# Patient Record
Sex: FEMALE | Employment: FULL TIME | URBAN - METROPOLITAN AREA
[De-identification: names, ages, dates, MRNs, and addresses within clinical notes are randomized per-mention and may not be internally consistent; named-entity substitution may affect disease eponyms.]

---

## 2021-06-02 NOTE — PROGRESS NOTES
SUBJECTIVE:   Haley Dejesus is a 25 year old female who presents to clinic today to discuss the following problem(s).    Patient is new to clinic and due for annual exam, however she prefers to address acute issues only at this time. Will plan for a return preventive visit in the near future.    SPICE: Patient was living back in [place], [place] with her boyfriend up until about two months ago when she went through a very bad break up. Over the past two months she has left her job, her car has broken down and now she is living in her mother's basement. It has been an extremely stressful time during which she has experienced the concerning symptoms listed below.    Sore throat with swelling  - three separate times over the past few months she will develop severe sore throat with associated fever, chills, body aches, difficulty swallowing  - she shows me a picture of her throat during one of these episodes and her tonsils do appear significantly enlarged and enflamed  - the episodes last for about a week and then resolve on their own  - the last episode was about three weeks ago now  - she worries that the episodes appear to be getting worse with each successive episode  - denies any significant lymphadenopathy  - no associated rash     Abnormal weight loss/fatigue  - believes she has lost about 20 pounds over the past 6 months without trying  - associated fatigue, always tired   - hard to fall asleep, hard to stay asleep with current anxieties  - she does acknowledge when she is stressed she loses her appetite  - she also acknowledges she hasn't been sleeping well because of stress  - she also acknowledges she was going to the gym almost daily and sometimes twice daily before her recent breakup. She still remains physically active by nowhere near what she was before then  - she acknowledges her menstrual cycle can be very irregular when she is stressed    ADHD  - previously diagnosed when she was about 20  - has been  "taking adderall since that time  - does not take it every day, tries to use it on a PRN basis  - agrees to complete an ARI today to have previous medical records faxed over        ROS: 10 point ROS neg other than the symptoms noted above in the HPI.    History reviewed. No pertinent past medical history.  History reviewed. No pertinent surgical history.  Family History   Problem Relation Age of Onset     Hypothyroidism Mother      Cystic Fibrosis Mother         mild form      Hypothyroidism Maternal Aunt      Social History     Tobacco Use     Smoking status: Never Smoker     Smokeless tobacco: Never Used   Substance Use Topics     Alcohol use: Yes     Frequency: 2-3 times a week     Drinks per session: 3 or 4     Drug use: Never     Social History     Social History Narrative     Not on file       Current Outpatient Medications   Medication     albuterol (PROAIR HFA/PROVENTIL HFA/VENTOLIN HFA) 108 (90 Base) MCG/ACT inhaler     amphetamine-dextroamphetamine (ADDERALL) 10 MG tablet     FLUoxetine 20 MG tablet     fluticasone-salmeterol (ADVAIR) 100-50 MCG/DOSE inhaler     levonorgestrel-ethinyl estradiol (AVIANE) 0.1-20 MG-MCG tablet     No current facility-administered medications for this visit.      I have reviewed the patient's past medical, surgical, family, and social history.     OBJECTIVE:   /86 (BP Location: Right arm, Patient Position: Sitting, Cuff Size: Adult Regular)   Pulse 88   Temp 97.4  F (36.3  C) (Skin)   Resp 15   Ht 1.689 m (5' 6.5\")   Wt 56.5 kg (124 lb 8 oz)   LMP 05/18/2021 (Approximate)   SpO2 95%   BMI 19.79 kg/m      Constitutional: well-appearing, appears stated age  Eyes: conjunctivae without erythema, sclera anicteric.   Cardiac: regular rate and rhythm, normal S1/S2, no murmur/rubs/gallops  Respiratory: lungs clear to auscultation bilaterally, normal work of breathing, no wheezes/crackles  Skin: no rashes, lesions, or wounds  Psych: affect is full and appropriate, speech " is fluent and non-pressured    ASSESSMENT AND PLAN:     Haley was seen today for establish care, recheck medication, throat problem and weight loss.    Diagnoses and all orders for this visit:    Screening for lipid disorders  -     Lipid Panel ([facility])    Fatigue, unspecified type  -     Cancel: CBC with Diff Plt ([facility])  -     Comprehensive Metabolic Panel ([place])  -     TSH with free T4 reflex  -     Vitamin D Deficiency  -     CBC with platelets differential    Weight loss  -     Cancel: CBC with Diff Plt ([facility])  -     Comprehensive Metabolic Panel ([place])  -     TSH with free T4 reflex  -     Vitamin D Deficiency    Attention deficit hyperactivity disorder (ADHD), unspecified ADHD type    Throat swelling    Will check labs for possible physiologic etiology for ongoing weight loss and/or fatigue. Suspicion for significant contribution from current life stressors.     Unclear what is causing what appears to be cyclic sore throat. Patient reports she is fully immunized against COVID. Doesn't sound like mono. If symptoms return, encouraged patient to come in for possible throat swab and exam, could consider referral to ENT depending on exam.     I did offer resources of [facility] as well as dietician given concerns for ongoing anxiety as well as weight loss I suspect is due to anxiety, but patient declines both of these today.     I will await records from previous provider as address ongoing ADHD accordingly.       Issa Mendoza MD  [facility]  06/08/2021, 4:12 PM

## 2021-06-08 ENCOUNTER — OFFICE VISIT (OUTPATIENT)
Dept: FAMILY MEDICINE | Facility: CLINIC | Age: 26
End: 2021-06-08
Payer: COMMERCIAL

## 2021-06-08 VITALS
SYSTOLIC BLOOD PRESSURE: 124 MMHG | WEIGHT: 124.5 LBS | RESPIRATION RATE: 15 BRPM | TEMPERATURE: 97.4 F | HEIGHT: 67 IN | BODY MASS INDEX: 19.54 KG/M2 | DIASTOLIC BLOOD PRESSURE: 86 MMHG | OXYGEN SATURATION: 95 % | HEART RATE: 88 BPM

## 2021-06-08 DIAGNOSIS — R22.1 THROAT SWELLING: ICD-10-CM

## 2021-06-08 DIAGNOSIS — R53.83 FATIGUE, UNSPECIFIED TYPE: ICD-10-CM

## 2021-06-08 DIAGNOSIS — R63.4 WEIGHT LOSS: ICD-10-CM

## 2021-06-08 DIAGNOSIS — Z13.220 SCREENING FOR LIPID DISORDERS: Primary | ICD-10-CM

## 2021-06-08 DIAGNOSIS — F90.9 ATTENTION DEFICIT HYPERACTIVITY DISORDER (ADHD), UNSPECIFIED ADHD TYPE: ICD-10-CM

## 2021-06-08 LAB
ALBUMIN SERPL-MCNC: 4 G/DL (ref 3.3–4.6)
ALP SERPL-CCNC: 61 U/L (ref 40–150)
ALT SERPL-CCNC: 24 U/L (ref 0–50)
AST SERPL-CCNC: 24 U/L (ref 0–45)
BASOPHILS # BLD AUTO: 0.1 10E9/L (ref 0–0.2)
BASOPHILS NFR BLD AUTO: 1.2 %
BILIRUB SERPL-MCNC: 1.5 MG/DL (ref 0.2–1.3)
BUN SERPL-MCNC: 11 MG/DL (ref 5–24)
CALCIUM SERPL-MCNC: 9.8 MG/DL (ref 8.5–10.4)
CHLORIDE SERPLBLD-SCNC: 105 MMOL/L (ref 94–109)
CHOLEST SERPL-MCNC: 146 MG/DL (ref 0–200)
CHOLEST/HDLC SERPL: 2.4 {RATIO} (ref 0–5)
CO2 SERPL-SCNC: 29 MMOL/L (ref 20–32)
CREAT SERPL-MCNC: 1.1 MG/DL (ref 0.6–1.3)
DIFFERENTIAL METHOD BLD: NORMAL
EGFR CALCULATED (NON BLACK REFERENCE): 64.9
EOSINOPHIL # BLD AUTO: 0.2 10E9/L (ref 0–0.7)
EOSINOPHIL NFR BLD AUTO: 2.8 %
ERYTHROCYTE [DISTWIDTH] IN BLOOD BY AUTOMATED COUNT: 13.2 % (ref 10–15)
FASTING SPECIMEN: NO
GLUCOSE SERPL-MCNC: 91 MG/DL (ref 60–99)
HCT VFR BLD AUTO: 44.5 % (ref 35–47)
HDLC SERPL-MCNC: 60 MG/DL
HGB BLD-MCNC: 14.9 G/DL (ref 11.7–15.7)
IMM GRANULOCYTES # BLD: 0 10E9/L (ref 0–0.4)
IMM GRANULOCYTES NFR BLD: 0.4 %
LDLC SERPL CALC-MCNC: 72 MG/DL (ref 0–129)
LYMPHOCYTES # BLD AUTO: 3.2 10E9/L (ref 0.8–5.3)
LYMPHOCYTES NFR BLD AUTO: 43.6 %
MCH RBC QN AUTO: 30.3 PG (ref 26.5–33)
MCHC RBC AUTO-ENTMCNC: 33.5 G/DL (ref 31.5–36.5)
MCV RBC AUTO: 91 FL (ref 78–100)
MONOCYTES # BLD AUTO: 0.6 10E9/L (ref 0–1.3)
MONOCYTES NFR BLD AUTO: 7.5 %
NEUTROPHILS # BLD AUTO: 3.3 10E9/L (ref 1.6–8.3)
NEUTROPHILS NFR BLD AUTO: 44.5 %
NRBC # BLD AUTO: 0 10*3/UL
NRBC BLD AUTO-RTO: 0 /100
PLATELET # BLD AUTO: 263 10E9/L (ref 150–450)
POTASSIUM SERPL-SCNC: 4.2 MMOL/L (ref 3.4–5.3)
PROT SERPL-MCNC: 7 G/DL (ref 6.8–8.8)
RBC # BLD AUTO: 4.91 10E12/L (ref 3.8–5.2)
SODIUM SERPL-SCNC: 139 MMOL/L (ref 137.3–146.3)
TRIGL SERPL-MCNC: 70 MG/DL (ref 0–150)
TSH SERPL DL<=0.005 MIU/L-ACNC: 1.69 MU/L (ref 0.4–4)
VLDL-CHOLESTEROL: 14 (ref 7–32)
WBC # BLD AUTO: 7.4 10E9/L (ref 4–11)

## 2021-06-08 RX ORDER — LEVONORGESTREL/ETHIN.ESTRADIOL 0.1-0.02MG
1 TABLET ORAL DAILY
COMMUNITY
End: 2021-06-18

## 2021-06-08 RX ORDER — DEXTROAMPHETAMINE SACCHARATE, AMPHETAMINE ASPARTATE, DEXTROAMPHETAMINE SULFATE AND AMPHETAMINE SULFATE 2.5; 2.5; 2.5; 2.5 MG/1; MG/1; MG/1; MG/1
10 TABLET ORAL 2 TIMES DAILY
COMMUNITY
End: 2021-06-18

## 2021-06-08 RX ORDER — FLUOXETINE 20 MG/1
20 TABLET, FILM COATED ORAL DAILY
COMMUNITY
End: 2021-06-18

## 2021-06-08 RX ORDER — ALBUTEROL SULFATE 90 UG/1
2 AEROSOL, METERED RESPIRATORY (INHALATION) EVERY 6 HOURS
COMMUNITY

## 2021-06-08 SDOH — HEALTH STABILITY: MENTAL HEALTH: HOW OFTEN DO YOU HAVE 6 OR MORE DRINKS ON ONE OCCASION?: NOT ASKED

## 2021-06-08 SDOH — HEALTH STABILITY: MENTAL HEALTH: HOW MANY STANDARD DRINKS CONTAINING ALCOHOL DO YOU HAVE ON A TYPICAL DAY?: 3 OR 4

## 2021-06-08 SDOH — HEALTH STABILITY: MENTAL HEALTH: HOW OFTEN DO YOU HAVE A DRINK CONTAINING ALCOHOL?: 2-3 TIMES A WEEK

## 2021-06-08 ASSESSMENT — MIFFLIN-ST. JEOR: SCORE: 1339.42

## 2021-06-09 LAB — DEPRECATED CALCIDIOL+CALCIFEROL SERPL-MC: 34 UG/L (ref 20–75)

## 2021-06-09 ASSESSMENT — ANXIETY QUESTIONNAIRES
3. WORRYING TOO MUCH ABOUT DIFFERENT THINGS: MORE THAN HALF THE DAYS
5. BEING SO RESTLESS THAT IT IS HARD TO SIT STILL: MORE THAN HALF THE DAYS
6. BECOMING EASILY ANNOYED OR IRRITABLE: NOT AT ALL
1. FEELING NERVOUS, ANXIOUS, OR ON EDGE: NEARLY EVERY DAY
7. FEELING AFRAID AS IF SOMETHING AWFUL MIGHT HAPPEN: NOT AT ALL
GAD7 TOTAL SCORE: 10
IF YOU CHECKED OFF ANY PROBLEMS ON THIS QUESTIONNAIRE, HOW DIFFICULT HAVE THESE PROBLEMS MADE IT FOR YOU TO DO YOUR WORK, TAKE CARE OF THINGS AT HOME, OR GET ALONG WITH OTHER PEOPLE: VERY DIFFICULT
2. NOT BEING ABLE TO STOP OR CONTROL WORRYING: MORE THAN HALF THE DAYS

## 2021-06-09 ASSESSMENT — PATIENT HEALTH QUESTIONNAIRE - PHQ9
5. POOR APPETITE OR OVEREATING: SEVERAL DAYS
SUM OF ALL RESPONSES TO PHQ QUESTIONS 1-9: 13

## 2021-06-10 ASSESSMENT — ANXIETY QUESTIONNAIRES: GAD7 TOTAL SCORE: 10

## 2021-06-17 ENCOUNTER — TELEPHONE (OUTPATIENT)
Dept: FAMILY MEDICINE | Facility: CLINIC | Age: 26
End: 2021-06-17

## 2021-06-17 DIAGNOSIS — F41.9 ANXIETY AND DEPRESSION: ICD-10-CM

## 2021-06-17 DIAGNOSIS — F90.9 ATTENTION DEFICIT HYPERACTIVITY DISORDER (ADHD), UNSPECIFIED ADHD TYPE: ICD-10-CM

## 2021-06-17 DIAGNOSIS — F32.A ANXIETY AND DEPRESSION: ICD-10-CM

## 2021-06-17 DIAGNOSIS — Z30.9 ENCOUNTER FOR CONTRACEPTIVE MANAGEMENT, UNSPECIFIED TYPE: Primary | ICD-10-CM

## 2021-06-17 NOTE — TELEPHONE ENCOUNTER
Who is calling? Patient  Medication name: Adderall, Fluoxetine, levonorgestrel  Is this a refill request? Yes  Have they contacted the pharmacy?  No  Pharmacy:   [id number] - [facility], [place] - [address]  [address] [address]  Phone: [phone] Fax: [phone]    Question/Concern: Per SHIRA Mendoza waiting on records from [facility]. Records were pulled in from [facility]. Patient is in need of refills for these 3 meds  Would patient like a call back? Debo Alvarez

## 2021-06-17 NOTE — TELEPHONE ENCOUNTER
Adderall -  review shows last time patient prescribed Adderall 10 mg was 11/15/20 #60 (30 days) by a provider in [place]  Oral contraceptive - HISTORICAL  Advair - HISTORICAL  Fluoxetine - HISTORICAL    According to Care Everywhere, last visit with previous provider in [place] was 11/11/20 and med list:      Fluoxetine 20 mg daily    Albuterol HFA 1-2 puffs every 4 hours PRN    Fluticasone propionate-salmeterol 100-50 1 puff BID    Levonorgesterel-ethinyl estradiol 0.1-20 daily    Gabapentin 300 mg TID PRN for panic    Dextroamphetamine-amphetamine 10 mg BID PRN    Dr. Mendoza, only Adderall was addressed at last visit, not sure if UDS done and controlled contract completed for Adderall. Do you want an appointment for Medication Refill?     Shirley Garza RN  06/18/21  12:11 PM

## 2021-06-18 ENCOUNTER — TELEPHONE (OUTPATIENT)
Dept: FAMILY MEDICINE | Facility: CLINIC | Age: 26
End: 2021-06-18

## 2021-06-18 RX ORDER — LEVONORGESTREL/ETHIN.ESTRADIOL 0.1-0.02MG
1 TABLET ORAL DAILY
Qty: 90 TABLET | Refills: 3 | Status: SHIPPED | OUTPATIENT
Start: 2021-06-18 | End: 2022-06-08

## 2021-06-18 RX ORDER — FLUOXETINE 20 MG/1
20 TABLET, FILM COATED ORAL DAILY
Qty: 90 TABLET | Refills: 3 | Status: SHIPPED | OUTPATIENT
Start: 2021-06-18 | End: 2022-06-08

## 2021-06-18 RX ORDER — DEXTROAMPHETAMINE SACCHARATE, AMPHETAMINE ASPARTATE, DEXTROAMPHETAMINE SULFATE AND AMPHETAMINE SULFATE 2.5; 2.5; 2.5; 2.5 MG/1; MG/1; MG/1; MG/1
10 TABLET ORAL 2 TIMES DAILY
Qty: 60 TABLET | Refills: 0 | Status: SHIPPED | OUTPATIENT
Start: 2021-06-18 | End: 2021-09-28

## 2021-06-18 NOTE — TELEPHONE ENCOUNTER
Prior Authorization Retail Medication Request    Medication/Dose: amphetamine-dextroamphetamine (ADDERALL) 10 MG tablet  ICD code (if different than what is on RX):    Previously Tried and Failed:    Rationale:      Insurance Name:    Insurance ID:        Pharmacy Information (if different than what is on RX)  Name:    Phone:      Shirley Garza RN  06/18/21  2:45 PM

## 2021-06-18 NOTE — TELEPHONE ENCOUNTER
After chart review, I agree to refill medications as noted below. Plan on monthly monitoring of ADHD medications with random Utox for now.     Haley was seen today for medication request.    Diagnoses and all orders for this visit:    Encounter for contraceptive management, unspecified type  -     levonorgestrel-ethinyl estradiol (AVIANE) 0.1-20 MG-MCG tablet; Take 1 tablet by mouth daily    Anxiety and depression  -     FLUoxetine 20 MG tablet; Take 1 tablet (20 mg) by mouth daily    Attention deficit hyperactivity disorder (ADHD), unspecified ADHD type  -     amphetamine-dextroamphetamine (ADDERALL) 10 MG tablet; Take 1 tablet (10 mg) by mouth 2 times daily      Issa Mendoza MD  12:58 PM, June 18, 2021

## 2021-06-21 NOTE — TELEPHONE ENCOUNTER
Central Prior Authorization Team   [phone]    PA Initiation    Medication: amphetamine-dextroamphetamine (ADDERALL) 10 MG tablet  Insurance Company: [employer] ([facility]) - Phone [phone] Fax [phone]  Pharmacy [facility]illing the Rx[id number] - [facility], [place] - [address]  Filling Pharmacy Phone: [phone]  Filling Pharmacy Fax: [phone]  Start Date: 6/21/2021

## 2021-06-22 ENCOUNTER — OFFICE VISIT (OUTPATIENT)
Dept: FAMILY MEDICINE | Facility: CLINIC | Age: 26
End: 2021-06-22
Payer: COMMERCIAL

## 2021-06-22 VITALS
BODY MASS INDEX: 20.25 KG/M2 | RESPIRATION RATE: 13 BRPM | TEMPERATURE: 97.1 F | HEIGHT: 66 IN | OXYGEN SATURATION: 97 % | HEART RATE: 92 BPM | WEIGHT: 126 LBS | DIASTOLIC BLOOD PRESSURE: 85 MMHG | SYSTOLIC BLOOD PRESSURE: 122 MMHG

## 2021-06-22 DIAGNOSIS — J35.1 ENLARGED TONSILS: ICD-10-CM

## 2021-06-22 DIAGNOSIS — N32.9 BLADDER PROBLEM: Primary | ICD-10-CM

## 2021-06-22 DIAGNOSIS — R11.0 NAUSEA: ICD-10-CM

## 2021-06-22 DIAGNOSIS — F90.9 ATTENTION DEFICIT HYPERACTIVITY DISORDER (ADHD), UNSPECIFIED ADHD TYPE: ICD-10-CM

## 2021-06-22 PROBLEM — F43.9 STRESS: Status: ACTIVE | Noted: 2020-11-11

## 2021-06-22 PROBLEM — F41.9 ANXIETY: Status: ACTIVE | Noted: 2020-11-11

## 2021-06-22 PROBLEM — J45.909 ASTHMA: Status: ACTIVE | Noted: 2020-11-11

## 2021-06-22 PROBLEM — R03.0 ELEVATED BP WITHOUT DIAGNOSIS OF HYPERTENSION: Status: ACTIVE | Noted: 2020-11-11

## 2021-06-22 LAB
BILIRUBIN UR: NEGATIVE MG/DL
BLOOD UR: NEGATIVE MG/DL
GLUCOSE URINE: NEGATIVE
KETONES UR QL: NEGATIVE MG/DL
LEUKOCYTE ESTERASE UR: ABNORMAL
NITRITE UR QL STRIP: NEGATIVE MG/DL
PH UR STRIP: 7.5 [PH] (ref 4.5–8)
PROTEIN UR: NEGATIVE MG/DL
SP GR UR STRIP: 1.01 (ref 1–1.03)
UROBILINOGEN UR STRIP-ACNC: ABNORMAL E.U./DL

## 2021-06-22 RX ORDER — GABAPENTIN 300 MG/1
300 CAPSULE ORAL
COMMUNITY
Start: 2020-11-11 | End: 2022-01-03

## 2021-06-22 ASSESSMENT — MIFFLIN-ST. JEOR: SCORE: 1346.15

## 2021-06-22 NOTE — PROGRESS NOTES
SUBJECTIVE:   Haley Dejesus is a 25 year old female who presents to clinic today to discuss the following problem(s).    (N32.9) Bladder problem  (primary encounter diagnosis)  - for about a week  - burning, clouding, itching  - thinks maybe it's a little bit better more recently  - has had UTIs in the past, the worst ended up sending her to the hospital for IV antibiotics  - today, denies fever, chills, body aches, nausea, no flank pain  - denies fishy odor although does report her urine is quite strong smelling  - no identified vaginal discharge although she does report she just finished her period    Persistent throat concerns  - as previously discussed  - no more acute pain episodes, but she does wonder at the tonsils being persistently enlarged  - could strep throat cause something like this?  - also notes associated sinus congestion and drainage with intermittent headaches  - denies difficulty swallowing or breathing     Nausea  - chronic  - intermittent  - has taken zofran occasionally for this in the past, has run out of this medication  - acknowledges significant life stressors currently  - does report she generally eats a very healthy diet  - drinks lots of coffee  - doesn't really exercise more than once a week  - frequently feels thirsty, thinks she could drink more water    ADHD  - currently out of her medication, was told by the pharmacy the med required a prior authorization and she hasn't heard since then     ROS: 10 point ROS neg other than the symptoms noted above in the HPI.      History reviewed. No pertinent past medical history.  History reviewed. No pertinent surgical history.  Family History   Problem Relation Age of Onset     Hypothyroidism Mother      Cystic Fibrosis Mother         mild form      Hypothyroidism Maternal Aunt      Social History     Tobacco Use     Smoking status: Never Smoker     Smokeless tobacco: Never Used   Substance Use Topics     Alcohol use: Yes     Frequency:  "2-3 times a week     Drinks per session: 3 or 4     Drug use: Never     Social History     Social History Narrative     Not on file       Current Outpatient Medications   Medication     albuterol (PROAIR HFA/PROVENTIL HFA/VENTOLIN HFA) 108 (90 Base) MCG/ACT inhaler     amphetamine-dextroamphetamine (ADDERALL) 10 MG tablet     FLUoxetine 20 MG tablet     fluticasone-salmeterol (ADVAIR) 100-50 MCG/DOSE inhaler     levonorgestrel-ethinyl estradiol (AVIANE) 0.1-20 MG-MCG tablet     No current facility-administered medications for this visit.      I have reviewed the patient's past medical, surgical, family, and social history.     OBJECTIVE:   /85   Pulse 92   Temp 97.1  F (36.2  C)   Resp 13   Ht 1.689 m (5' 6.5\")   Wt 57.2 kg (126 lb)   LMP 06/15/2021 (Approximate)   SpO2 97%   BMI 20.03 kg/m      Constitutional: well-appearing, appears stated age  Eyes: conjunctivae without erythema, sclera anicteric.   Cardiac: regular rate and rhythm, normal S1/S2, no murmur/rubs/gallops  Respiratory: lungs clear to auscultation bilaterally, normal work of breathing, no wheezes/crackles  Skin: no rashes, lesions, or wounds  Psych: affect is full and appropriate, speech is fluent and non-pressured    Recent Results (from the past 24 hour(s))   Urinalysis ([place])    Collection Time: 06/22/21  2:02 PM   Result Value Ref Range    Leukocyte Esterase UR Trace (A) Negative    Nitrite Urine Negative Negative mg/dL    Protein UR Negative Negative mg/dL    Glucose Urine Negative Negative    Ketones Urine Negative Negative mg/dL    Urobilinogen mg/dL 0.2 E.U./dL 0.2 E.U./dL E.U./dL    Bilirubin UR Negative Negative mg/dL    Blood UR Negative Negative mg/dL    pH Urine 7.5 4.5 - 8.0    Specific Gravity Urine 1.015 1.005 - 1.030     U/A largely unremarkable.     ASSESSMENT AND PLAN:     Haley was seen today for urinary problem, mouth problem and refill request.    Diagnoses and all orders for this visit:    Bladder problem  - "     Urinalysis ([place])    Enlarged tonsils    Nausea    Attention deficit hyperactivity disorder (ADHD), unspecified ADHD type    U/A largely unremarkable. Given her history, I suggested a 2 day course of pyridium but if symptoms fail to improve would consider starting empiric therapy despite U/A results. Low suspicion for BV or yeast infection but cannot completely rule these out.     Tonsils appear mildly enlarged today. Patient also reports some sinus drainage and headaches which could indicate ongoing sinus issues with could be contributing to persistent symptoms with occasional flares of acute pain. Advised patient start daily flonase therapy. Will also refer to ENT at this point in case flonase does not appear to improve symptoms before Haley can be seen in clinic.     Not entirely sure what is causing the chronic nausea. I did recommend increased fluids, daily activity, decreased coffee. I suspect ongoing life stressors are part of the issue as well. Will refill zofran today and continue to monitor.     Discussed prior auth process for ADHD medications. Encouraged Haley to contact me if she has not heard resolution of this from her pharmacy within the next few days.       Issa Mendoza MD  [facility]  06/22/2021, 2:01 PM

## 2021-06-22 NOTE — NURSING NOTE
"25 year old  Chief Complaint   Patient presents with     Urinary Problem     UTI Sx started about a week ago. Some abdominal discomfort and burning during while using the bathroom     Mouth Problem     swollen tonsils since this past Feb. also had some flare up's with sweats and chills     Refill Request     zofran refill       Blood pressure 122/85, pulse 92, temperature 97.1  F (36.2  C), resp. rate 13, height 1.689 m (5' 6.5\"), weight 57.2 kg (126 lb), last menstrual period 06/15/2021, SpO2 97 %. Body mass index is 20.03 kg/m .  There is no problem list on file for this patient.      Wt Readings from Last 2 Encounters:   06/22/21 57.2 kg (126 lb)   06/08/21 56.5 kg (124 lb 8 oz)     BP Readings from Last 3 Encounters:   06/22/21 122/85   06/08/21 124/86         Current Outpatient Medications   Medication     albuterol (PROAIR HFA/PROVENTIL HFA/VENTOLIN HFA) 108 (90 Base) MCG/ACT inhaler     amphetamine-dextroamphetamine (ADDERALL) 10 MG tablet     FLUoxetine 20 MG tablet     fluticasone-salmeterol (ADVAIR) 100-50 MCG/DOSE inhaler     levonorgestrel-ethinyl estradiol (AVIANE) 0.1-20 MG-MCG tablet     No current facility-administered medications for this visit.        Social History     Tobacco Use     Smoking status: Never Smoker     Smokeless tobacco: Never Used   Substance Use Topics     Alcohol use: Yes     Frequency: 2-3 times a week     Drinks per session: 3 or 4     Drug use: Never       Health Maintenance Due   Topic Date Due     PREVENTIVE CARE VISIT  Never done     CHLAMYDIA SCREENING  Never done     ADVANCE CARE PLANNING  Never done     DEPRESSION ACTION PLAN  Never done     DTAP/TDAP/TD IMMUNIZATION (2 - Td) 09/22/2008     HIV SCREENING  Never done     HEPATITIS C SCREENING  Never done     PAP  Never done       No results found for: PAP      June 22, 2021 1:55 PM  "

## 2021-06-23 ASSESSMENT — ASTHMA QUESTIONNAIRES: ACT_TOTALSCORE: 22

## 2021-06-23 NOTE — TELEPHONE ENCOUNTER
Prior Authorization Approval    Authorization Effective Date: 6/21/2021  Authorization Expiration Date: 6/21/2022  Medication: amphetamine-dextroamphetamine (ADDERALL) 10 MG tablet-PA APPROVED   Approved Dose/Quantity:   Reference #:     Insurance Company: [employer]qui ([facility]) - Phone [phone] Fax [phone]  Expected CoPay:       CoPay Card Available:      Foundation Assistance Needed:    Which Pharmacy is filling the prescription (Not needed for infusion/clinic administered): [id number], [place] - [address]  Pharmacy Notified: Yes- **Instructed pharmacy to notify patient when script is ready to /ship.**   Patient Notified: Yes

## 2021-06-26 ENCOUNTER — HEALTH MAINTENANCE LETTER (OUTPATIENT)
Age: 26
End: 2021-06-26

## 2021-07-12 NOTE — TELEPHONE ENCOUNTER
FUTURE VISIT INFORMATION      FUTURE VISIT INFORMATION:    Date: 8/3/21    Time: 3 PM    Location: [facility]  REFERRAL INFORMATION:    Referring provider:  Dr. Issa Mendoza    Referring providers clinic:  [facility]    Reason for visit/diagnosis: Enlarged Tonsils    RECORDS REQUESTED FROM:       Clinic name Comments Records Status Imaging Status   [facility] 6/22/21, 6/8/21 - [facility] OV with Dr. Mendoza Epic

## 2021-08-03 ENCOUNTER — PRE VISIT (OUTPATIENT)
Dept: OTOLARYNGOLOGY | Facility: CLINIC | Age: 26
End: 2021-08-03

## 2021-09-28 ENCOUNTER — MYC REFILL (OUTPATIENT)
Dept: FAMILY MEDICINE | Facility: CLINIC | Age: 26
End: 2021-09-28

## 2021-09-28 DIAGNOSIS — F90.9 ATTENTION DEFICIT HYPERACTIVITY DISORDER (ADHD), UNSPECIFIED ADHD TYPE: ICD-10-CM

## 2021-09-28 RX ORDER — DEXTROAMPHETAMINE SACCHARATE, AMPHETAMINE ASPARTATE, DEXTROAMPHETAMINE SULFATE AND AMPHETAMINE SULFATE 2.5; 2.5; 2.5; 2.5 MG/1; MG/1; MG/1; MG/1
10 TABLET ORAL 2 TIMES DAILY
Qty: 60 TABLET | Refills: 0 | Status: SHIPPED | OUTPATIENT
Start: 2021-09-28 | End: 2022-01-02

## 2021-09-28 NOTE — TELEPHONE ENCOUNTER
reviewed. No concerns. Med refilled as requested.    Haley was seen today for refill request.    Diagnoses and all orders for this visit:    Attention deficit hyperactivity disorder (ADHD), unspecified ADHD type  -     amphetamine-dextroamphetamine (ADDERALL) 10 MG tablet; Take 1 tablet (10 mg) by mouth 2 times daily      Issa Mendoza MD  2:50 PM, September 28, 2021

## 2021-10-16 ENCOUNTER — HEALTH MAINTENANCE LETTER (OUTPATIENT)
Age: 26
End: 2021-10-16

## 2021-12-30 NOTE — TELEPHONE ENCOUNTER
FUTURE VISIT INFORMATION      FUTURE VISIT INFORMATION:    Date: 3/7/2022    Time: 8:30AM    Location: [facility]  REFERRAL INFORMATION:    Referring provider:  Issa Mendoza MD    Referring providers clinic:  [facility]     Reason for visit/diagnosis  Enlarged tonsils, recs in epic, ref by Issa Mendoza MD in [facility], appt made by pt    RECORDS REQUESTED FROM:       Clinic name Comments Records Status Imaging Status   [facility] ER  6/22/2021 note and referral from Issa Mendoza MD Epic

## 2022-01-02 ENCOUNTER — MYC REFILL (OUTPATIENT)
Dept: FAMILY MEDICINE | Facility: CLINIC | Age: 27
End: 2022-01-02
Payer: COMMERCIAL

## 2022-01-02 DIAGNOSIS — F90.9 ATTENTION DEFICIT HYPERACTIVITY DISORDER (ADHD), UNSPECIFIED ADHD TYPE: ICD-10-CM

## 2022-01-03 RX ORDER — DEXTROAMPHETAMINE SACCHARATE, AMPHETAMINE ASPARTATE, DEXTROAMPHETAMINE SULFATE AND AMPHETAMINE SULFATE 2.5; 2.5; 2.5; 2.5 MG/1; MG/1; MG/1; MG/1
10 TABLET ORAL 2 TIMES DAILY
Qty: 60 TABLET | Refills: 0 | Status: SHIPPED | OUTPATIENT
Start: 2022-01-03 | End: 2022-04-18

## 2022-01-03 NOTE — TELEPHONE ENCOUNTER
Amphetamine-dextroamphetamine (Adderall) 10 mg    Last Office Visit: 6/22/21  Future [facility] Appointments: None  Medication last refilled: 9/28/21 #60 with 0 refill(s)     verified - last fill date: 10/17/21 #60    Yudith Mullins RN, BSN

## 2022-01-03 NOTE — TELEPHONE ENCOUNTER
reviewed, no concerns. Med refilled as requested.     Haley was seen today for refill request.    Diagnoses and all orders for this visit:    Attention deficit hyperactivity disorder (ADHD), unspecified ADHD type  -     amphetamine-dextroamphetamine (ADDERALL) 10 MG tablet; Take 1 tablet (10 mg) by mouth 2 times daily      Issa Mendoza MD  12:27 PM, January 3, 2022

## 2022-01-26 DIAGNOSIS — F90.9 ATTENTION DEFICIT HYPERACTIVITY DISORDER (ADHD), UNSPECIFIED ADHD TYPE: ICD-10-CM

## 2022-01-26 RX ORDER — DEXTROAMPHETAMINE SACCHARATE, AMPHETAMINE ASPARTATE, DEXTROAMPHETAMINE SULFATE AND AMPHETAMINE SULFATE 2.5; 2.5; 2.5; 2.5 MG/1; MG/1; MG/1; MG/1
10 TABLET ORAL 2 TIMES DAILY
Qty: 60 TABLET | Refills: 0 | Status: CANCELLED | OUTPATIENT
Start: 2022-01-26

## 2022-01-26 NOTE — TELEPHONE ENCOUNTER
Who is calling? Patient  Medication name: amphetamine-dextroamphetamine (ADDERALL) 10 MG tablet  Is this a refill request? Yes  Have they contacted the pharmacy?  Yes  Pharmacy:   [facility] ([facility])  [address], [place]  [phone]  Fax  [phone]  Question/Concern: Refill request   Would patient like a call back? No

## 2022-01-26 NOTE — TELEPHONE ENCOUNTER
[facility] [address] and they have the Adderall prescription on file that was written 1/3/22.  Haley just needs to contact them and request they fill it.  Voicemail left for Haley relaying the information above and to call us if she has any further questions.  Yudith Mullins RN, BSN  [facility]  01/26/22  4:01 PM

## 2022-03-07 ENCOUNTER — PRE VISIT (OUTPATIENT)
Dept: OTOLARYNGOLOGY | Facility: CLINIC | Age: 27
End: 2022-03-07

## 2022-04-15 DIAGNOSIS — F90.9 ATTENTION DEFICIT HYPERACTIVITY DISORDER (ADHD), UNSPECIFIED ADHD TYPE: ICD-10-CM

## 2022-04-15 NOTE — TELEPHONE ENCOUNTER
Who is calling? Patient  Medication name: amphetamine-dextroamphetamine (ADDERALL) 10 MG tablet  Is this a refill request? Yes  Have they contacted the pharmacy?  Yes  [id number] [place], [place] - [address]  [address] [place] [address]  Phone: [phone] Fax: [phone]    Question/Concern: Out of the medication  Would patient like a call back? No

## 2022-04-15 NOTE — TELEPHONE ENCOUNTER
Medication requested: amphetamine-dextroamphetamine (ADDERALL) 10 MG tablet  Last office visit: 6/22/21  [facility] appointments: none  Medication last refilled: 1/3/22; 60 + 0 refills, last sold on 1/29/22 verified per   Last qualifying labs: N/A    Routing refill request to provider for review/approval because:  Drug not on the [facility] refill protocol     Guido BUI, RN  04/15/22 2:55 PM

## 2022-04-18 DIAGNOSIS — F90.9 ATTENTION DEFICIT HYPERACTIVITY DISORDER (ADHD), UNSPECIFIED ADHD TYPE: ICD-10-CM

## 2022-04-18 RX ORDER — DEXTROAMPHETAMINE SACCHARATE, AMPHETAMINE ASPARTATE, DEXTROAMPHETAMINE SULFATE AND AMPHETAMINE SULFATE 2.5; 2.5; 2.5; 2.5 MG/1; MG/1; MG/1; MG/1
10 TABLET ORAL 2 TIMES DAILY
Qty: 60 TABLET | Refills: 0 | Status: CANCELLED | OUTPATIENT
Start: 2022-04-18

## 2022-04-18 RX ORDER — DEXTROAMPHETAMINE SACCHARATE, AMPHETAMINE ASPARTATE, DEXTROAMPHETAMINE SULFATE AND AMPHETAMINE SULFATE 2.5; 2.5; 2.5; 2.5 MG/1; MG/1; MG/1; MG/1
10 TABLET ORAL 2 TIMES DAILY
Qty: 60 TABLET | Refills: 0 | Status: SHIPPED | OUTPATIENT
Start: 2022-04-18 | End: 2022-06-13

## 2022-04-18 NOTE — PROGRESS NOTES
reviewed, no issues. Med refilled as requested.     Diagnoses and all orders for this visit:    Attention deficit hyperactivity disorder (ADHD), unspecified ADHD type  -     amphetamine-dextroamphetamine (ADDERALL) 10 MG tablet; Take 1 tablet (10 mg) by mouth 2 times daily      Issa Mendoza MD  8:26 AM, April 18, 2022  ]

## 2022-06-08 DIAGNOSIS — F32.A ANXIETY AND DEPRESSION: ICD-10-CM

## 2022-06-08 DIAGNOSIS — Z30.9 ENCOUNTER FOR CONTRACEPTIVE MANAGEMENT, UNSPECIFIED TYPE: ICD-10-CM

## 2022-06-08 DIAGNOSIS — F41.9 ANXIETY AND DEPRESSION: ICD-10-CM

## 2022-06-08 RX ORDER — FLUOXETINE 20 MG/1
20 TABLET, FILM COATED ORAL DAILY
Qty: 90 TABLET | Refills: 0 | Status: SHIPPED | OUTPATIENT
Start: 2022-06-08 | End: 2022-10-07

## 2022-06-08 RX ORDER — LEVONORGESTREL/ETHIN.ESTRADIOL 0.1-0.02MG
1 TABLET ORAL DAILY
Qty: 90 TABLET | Refills: 0 | Status: SHIPPED | OUTPATIENT
Start: 2022-06-08 | End: 2022-09-16

## 2022-06-08 NOTE — TELEPHONE ENCOUNTER
Last visit 6/22/21, no future visit  Medication is being filled for 1 time refill only due to:  Patient needs to be seen because it has been more than one year since last visit.     Mary Kay Willis RN  [facility]

## 2022-06-10 DIAGNOSIS — F90.9 ATTENTION DEFICIT HYPERACTIVITY DISORDER (ADHD), UNSPECIFIED ADHD TYPE: ICD-10-CM

## 2022-06-10 NOTE — TELEPHONE ENCOUNTER
Called to verify what pharmacy scripts should be going to.   Already sent to [facility] on [address], but got a refill request from [facility] store [id number] NICOLE Salguero

## 2022-06-13 RX ORDER — DEXTROAMPHETAMINE SACCHARATE, AMPHETAMINE ASPARTATE, DEXTROAMPHETAMINE SULFATE AND AMPHETAMINE SULFATE 2.5; 2.5; 2.5; 2.5 MG/1; MG/1; MG/1; MG/1
10 TABLET ORAL 2 TIMES DAILY
Qty: 60 TABLET | Refills: 0 | Status: SHIPPED | OUTPATIENT
Start: 2022-06-13 | End: 2022-09-16

## 2022-06-13 NOTE — TELEPHONE ENCOUNTER
Medication requested: amphetamine-dextroamphetamine (ADDERALL) 10 MG tablet  Last office visit: 6/22/21  [facility] appointments: none  Medication last refilled: 4/18/22, 60 + 0 refills, last sold 4/27/22 verified per   Last qualifying labs: N/A    Routing refill request to provider for review/approval because:  Drug not on the [facility] refill protocol   Patient needs to be seen because:  She will be due for office visit by end of month    Guido BUI, RN  06/13/22 6:12 PM

## 2022-07-23 ENCOUNTER — HEALTH MAINTENANCE LETTER (OUTPATIENT)
Age: 27
End: 2022-07-23

## 2022-09-16 ENCOUNTER — MYC REFILL (OUTPATIENT)
Dept: FAMILY MEDICINE | Facility: CLINIC | Age: 27
End: 2022-09-16

## 2022-09-16 DIAGNOSIS — F90.9 ATTENTION DEFICIT HYPERACTIVITY DISORDER (ADHD), UNSPECIFIED ADHD TYPE: ICD-10-CM

## 2022-09-16 DIAGNOSIS — Z30.9 ENCOUNTER FOR CONTRACEPTIVE MANAGEMENT, UNSPECIFIED TYPE: ICD-10-CM

## 2022-09-16 RX ORDER — DEXTROAMPHETAMINE SACCHARATE, AMPHETAMINE ASPARTATE, DEXTROAMPHETAMINE SULFATE AND AMPHETAMINE SULFATE 2.5; 2.5; 2.5; 2.5 MG/1; MG/1; MG/1; MG/1
10 TABLET ORAL 2 TIMES DAILY
Qty: 60 TABLET | Refills: 0 | Status: SHIPPED | OUTPATIENT
Start: 2022-09-16 | End: 2022-10-07

## 2022-09-16 NOTE — TELEPHONE ENCOUNTER
reviewed, no concerns. Med refilled as requested.    Haley was seen today for refill request.    Diagnoses and all orders for this visit:    Attention deficit hyperactivity disorder (ADHD), unspecified ADHD type  -     amphetamine-dextroamphetamine (ADDERALL) 10 MG tablet; Take 1 tablet (10 mg) by mouth 2 times daily      Issa Mendoza MD  6:30 PM, September 16, 2022

## 2022-09-16 NOTE — TELEPHONE ENCOUNTER
Amphetamine-dextroamphetamine (Adderall) 10 mg    Last Office Visit: 6/22/21  Future [facility] Appointments: 10/7/22  Medication last refilled: 6/13/22 #60 with 0 refill(s)     verified - last fill date: 6/24/22 #60    Routing refill request to provider for review/approval because:  Drug not on the [facility] refill protocol     HAO Child, RN, CCM

## 2022-09-19 RX ORDER — LEVONORGESTREL/ETHIN.ESTRADIOL 0.1-0.02MG
1 TABLET ORAL DAILY
Qty: 90 TABLET | Refills: 0 | Status: SHIPPED | OUTPATIENT
Start: 2022-09-19 | End: 2022-10-07

## 2022-09-19 NOTE — TELEPHONE ENCOUNTER
Last visit 6/22/21, future appt 10/7/22  Medication is being filled for 1 time refill only due to:  Patient needs to be seen because it has been more than one year since last visit. Pt has appt 10/7/22  Mary Kay Willis RN  [facility]

## 2022-10-01 ENCOUNTER — HEALTH MAINTENANCE LETTER (OUTPATIENT)
Age: 27
End: 2022-10-01

## 2022-10-07 ENCOUNTER — OFFICE VISIT (OUTPATIENT)
Dept: FAMILY MEDICINE | Facility: CLINIC | Age: 27
End: 2022-10-07
Payer: COMMERCIAL

## 2022-10-07 VITALS
RESPIRATION RATE: 15 BRPM | HEART RATE: 81 BPM | SYSTOLIC BLOOD PRESSURE: 131 MMHG | OXYGEN SATURATION: 98 % | BODY MASS INDEX: 23.37 KG/M2 | TEMPERATURE: 97.8 F | WEIGHT: 147 LBS | DIASTOLIC BLOOD PRESSURE: 88 MMHG

## 2022-10-07 DIAGNOSIS — F90.9 ATTENTION DEFICIT HYPERACTIVITY DISORDER (ADHD), UNSPECIFIED ADHD TYPE: ICD-10-CM

## 2022-10-07 DIAGNOSIS — Z86.19 HISTORY OF COLD SORES: ICD-10-CM

## 2022-10-07 DIAGNOSIS — J03.90 TONSILLITIS: ICD-10-CM

## 2022-10-07 DIAGNOSIS — Z30.9 ENCOUNTER FOR CONTRACEPTIVE MANAGEMENT, UNSPECIFIED TYPE: ICD-10-CM

## 2022-10-07 DIAGNOSIS — F41.9 ANXIETY AND DEPRESSION: ICD-10-CM

## 2022-10-07 DIAGNOSIS — F32.A ANXIETY AND DEPRESSION: ICD-10-CM

## 2022-10-07 DIAGNOSIS — Z23 NEED FOR INFLUENZA VACCINATION: Primary | ICD-10-CM

## 2022-10-07 RX ORDER — LEVONORGESTREL/ETHIN.ESTRADIOL 0.1-0.02MG
1 TABLET ORAL DAILY
Qty: 90 TABLET | Refills: 0 | Status: SHIPPED | OUTPATIENT
Start: 2022-10-07 | End: 2022-10-25

## 2022-10-07 RX ORDER — DEXTROAMPHETAMINE SACCHARATE, AMPHETAMINE ASPARTATE, DEXTROAMPHETAMINE SULFATE AND AMPHETAMINE SULFATE 2.5; 2.5; 2.5; 2.5 MG/1; MG/1; MG/1; MG/1
10 TABLET ORAL 2 TIMES DAILY
Qty: 60 TABLET | Refills: 0 | Status: CANCELLED | OUTPATIENT
Start: 2022-10-07

## 2022-10-07 RX ORDER — DEXTROAMPHETAMINE SACCHARATE, AMPHETAMINE ASPARTATE, DEXTROAMPHETAMINE SULFATE AND AMPHETAMINE SULFATE 2.5; 2.5; 2.5; 2.5 MG/1; MG/1; MG/1; MG/1
10 TABLET ORAL 2 TIMES DAILY
Qty: 60 TABLET | Refills: 0 | Status: SHIPPED | OUTPATIENT
Start: 2022-10-07 | End: 2022-10-25

## 2022-10-07 RX ORDER — VALACYCLOVIR HYDROCHLORIDE 1 G/1
2000 TABLET, FILM COATED ORAL 2 TIMES DAILY
Qty: 4 TABLET | Refills: 4 | Status: SHIPPED | OUTPATIENT
Start: 2022-10-07 | End: 2022-10-23

## 2022-10-07 RX ORDER — FLUOXETINE 20 MG/1
20 TABLET, FILM COATED ORAL DAILY
Qty: 90 TABLET | Refills: 0 | Status: SHIPPED | OUTPATIENT
Start: 2022-10-07 | End: 2022-10-25

## 2022-10-07 ASSESSMENT — ASTHMA QUESTIONNAIRES
QUESTION_5 LAST FOUR WEEKS HOW WOULD YOU RATE YOUR ASTHMA CONTROL: SOMEWHAT CONTROLLED
QUESTION_2 LAST FOUR WEEKS HOW OFTEN HAVE YOU HAD SHORTNESS OF BREATH: ONCE OR TWICE A WEEK
ACT_TOTALSCORE: 17
ACT_TOTALSCORE: 17
QUESTION_3 LAST FOUR WEEKS HOW OFTEN DID YOUR ASTHMA SYMPTOMS (WHEEZING, COUGHING, SHORTNESS OF BREATH, CHEST TIGHTNESS OR PAIN) WAKE YOU UP AT NIGHT OR EARLIER THAN USUAL IN THE MORNING: FOUR OR MORE NIGHTS A WEEK
QUESTION_4 LAST FOUR WEEKS HOW OFTEN HAVE YOU USED YOUR RESCUE INHALER OR NEBULIZER MEDICATION (SUCH AS ALBUTEROL): ONCE A WEEK OR LESS
QUESTION_1 LAST FOUR WEEKS HOW MUCH OF THE TIME DID YOUR ASTHMA KEEP YOU FROM GETTING AS MUCH DONE AT WORK, SCHOOL OR AT HOME: NONE OF THE TIME

## 2022-10-07 ASSESSMENT — ANXIETY QUESTIONNAIRES
GAD7 TOTAL SCORE: 12
5. BEING SO RESTLESS THAT IT IS HARD TO SIT STILL: NEARLY EVERY DAY
3. WORRYING TOO MUCH ABOUT DIFFERENT THINGS: MORE THAN HALF THE DAYS
7. FEELING AFRAID AS IF SOMETHING AWFUL MIGHT HAPPEN: NOT AT ALL
2. NOT BEING ABLE TO STOP OR CONTROL WORRYING: SEVERAL DAYS
GAD7 TOTAL SCORE: 12
IF YOU CHECKED OFF ANY PROBLEMS ON THIS QUESTIONNAIRE, HOW DIFFICULT HAVE THESE PROBLEMS MADE IT FOR YOU TO DO YOUR WORK, TAKE CARE OF THINGS AT HOME, OR GET ALONG WITH OTHER PEOPLE: SOMEWHAT DIFFICULT
1. FEELING NERVOUS, ANXIOUS, OR ON EDGE: NEARLY EVERY DAY
6. BECOMING EASILY ANNOYED OR IRRITABLE: SEVERAL DAYS

## 2022-10-07 ASSESSMENT — PATIENT HEALTH QUESTIONNAIRE - PHQ9
5. POOR APPETITE OR OVEREATING: MORE THAN HALF THE DAYS
SUM OF ALL RESPONSES TO PHQ QUESTIONS 1-9: 10

## 2022-10-07 NOTE — NURSING NOTE
"Injectable Influenza Immunization Documentation    1.  Has the patient received the information for the injectable influenza vaccine? YES     2. Is the patient 6 months of age or older? YES     3. Does the patient have any of the following contraindications?         Severe allergy to eggs? No     Severe allergic reaction to previous influenza vaccines? No   Severe allergy to latex? No       History of Guillain-Walls syndrome? No     Currently have a temperature greater than 100.4F? No        4.  Severely egg allergic patients should have flu vaccine eligibility assessed by an MD, RN, or pharmacist, and those who received flu vaccine should be observed for 15 min by an MD, RN, Pharmacist, Medical Technician, or member of clinic staff.\": YES    5. Latex-allergic patients should be given latex-free influenza vaccine Yes. Please reference the Vaccine latex table to determine if your clinic s product is latex-containing.       Vaccination given by Rock Reyes, EMT          "

## 2022-10-07 NOTE — NURSING NOTE
26 year old  Chief Complaint   Patient presents with     Recheck Medication     Check in      Pharyngitis     Constant throat pain with flare ups, talk about going to specialist        Blood pressure 131/88, pulse 81, temperature 97.8  F (36.6  C), temperature source Skin, resp. rate 15, weight 66.7 kg (147 lb), last menstrual period 10/05/2022, SpO2 98 %. Body mass index is 23.37 kg/m .  Patient Active Problem List   Diagnosis     Anxiety     Asthma     Attention deficit hyperactivity disorder (ADHD)     Elevated BP without diagnosis of hypertension     Stress       Wt Readings from Last 2 Encounters:   10/07/22 66.7 kg (147 lb)   06/22/21 57.2 kg (126 lb)     BP Readings from Last 3 Encounters:   10/07/22 131/88   06/22/21 122/85   06/08/21 124/86         Current Outpatient Medications   Medication     albuterol (PROAIR HFA/PROVENTIL HFA/VENTOLIN HFA) 108 (90 Base) MCG/ACT inhaler     amphetamine-dextroamphetamine (ADDERALL) 10 MG tablet     FLUoxetine 20 MG tablet     fluticasone-salmeterol (ADVAIR) 100-50 MCG/DOSE inhaler     levonorgestrel-ethinyl estradiol (AVIANE) 0.1-20 MG-MCG tablet     No current facility-administered medications for this visit.       Social History     Tobacco Use     Smoking status: Never Smoker     Smokeless tobacco: Never Used   Substance Use Topics     Alcohol use: Yes     Drug use: Never       Health Maintenance Due   Topic Date Due     PREVENTIVE CARE VISIT  Never done     ASTHMA ACTION PLAN  Never done     ADVANCE CARE PLANNING  Never done     HIV SCREENING  Never done     HEPATITIS C SCREENING  Never done     PAP  Never done     COVID-19 Vaccine (3 - Booster for Pfizer series) 07/05/2021     ASTHMA CONTROL TEST  12/22/2021     INFLUENZA VACCINE (1) 09/01/2022       No results found for: PAP      October 7, 2022 1:00 PM

## 2022-10-07 NOTE — LETTER
[facility]  10/07/22  Patient: Haley Dejesus  YOB: 1995  Medical Record Number: [id number]                                                                                  Non-Opioid Controlled Substance Agreement    This is an agreement between you and your provider regarding safe and appropriate use of controlled substances prescribed by your care team. Controlled substances are?medicines that can cause physical and mental dependence (abuse).     There are strict laws about having and using these medicines. We here at [facility] are  committed to working with you in your efforts to get better. To support you in this work, we'll help you schedule regular office appointments for medicine refills. If we must cancel or change your appointment for any reason, we'll make sure you have enough medicine to last until your next appointment.     As a Provider, I will:     Listen carefully to your concerns while treating you with respect.     Recommend a treatment plan that I believe is in your best interest and may involve therapies other than medicine.      Talk with you often about the possible benefits and the risk of harm of any medicine that we prescribe for you.    Assess the safety of this medicine and check how well it works.      Provide a plan on how to taper (discontinue or go off) using this medicine if the decision is made to stop its use.      ::  As a Patient, I understand controlled substances:       Are prescribed by my care provider to help me function or work and manage my condition(s).?    Are strong medicines and can cause serious side effects.       Need to be taken exactly as prescribed.?Combining controlled substances with certain medicines or chemicals (such as illegal drugs, alcohol, sedatives, sleeping pills, and benzodiazepines) can be dangerous or even fatal.? If I stop taking my medicines suddenly, I may have severe withdrawal symptoms.     The risks, benefits,  and side effects of these medicine(s) were explained to me. I agree that:    1. I will take part in other treatments as advised by my care team. This may be psychiatry or counseling, physical therapy, behavioral therapy, group treatment or a referral to specialist.    2. I will keep all my appointments and understand this is part of the monitoring of controlled substances.?My care team may require an office visit for EVERY controlled substance refill. If I miss appointments or don t follow instructions, my care team may stop my medicine    3. I will take my medicines as prescribed. I will not change the dose or schedule unless my care team tells me to. There will be no refills if I run out early.      4. I may be asked to come to the clinic and complete a urine drug test or complete a pill count. If I don t give a urine sample or participate in a pill count, the care team may stop my medicine.    5. I will only receive controlled substance prescriptions from this clinic. If I am treated by another provider, I will tell them that I am taking controlled substances and that I have a treatment agreement with this provider. I will inform my [facility] care team within one business day if I am given a prescription for any controlled substance by another healthcare provider. My [facility] care team can contact other providers and pharmacists about my use of any medicines.    6. It is up to me to make sure that I don't run out of my medicines on weekends or holidays.?If my care team is willing to refill my prescription without a visit, I must request refills only during office hours. Refills may take up to 3 business days to process. I will use one pharmacy to fill all my controlled substance prescriptions. I will notify the clinic about any changes to my insurance or medicine availability.    7. I am responsible for my prescriptions. If the medicine/prescription is lost, stolen or destroyed, it will not be  replaced.?I also agree not to share controlled substance medicines with anyone.     8. I am aware I should not use any illegal or recreational drugs. I agree not to drink alcohol unless my care team says I can.     9. If I enroll in the [place] Medical Cannabis program, I will tell my care team before my next refill.    10. I will tell my care team right away if I become pregnant, have a new medical problem treated outside of my regular clinic, or have a change in my medicines.     11. I understand that this medicine can affect my thinking, judgment and reaction time.? Alcohol and drugs affect the brain and body, which can affect the safety of my driving. Being under the influence of alcohol or drugs can affect my decision-making, behaviors, personal safety and the safety of others. Driving while impaired (DWI) can occur if a person is driving, operating or in physical control of a car, motorcycle, boat, snowmobile, ATV, motorbike, off-road vehicle or any other motor vehicle ([place] Statute 169A.20). I understand the risk if I choose to drive or operate any vehicle or machinery.    I understand that if I do not follow any of the conditions above, my prescriptions or treatment may be stopped or changed.   I agree that my provider, clinic care team and pharmacy may work with any city, state or federal law enforcement agency that investigates the misuse, sale or other diversion of my controlled medicine. I will allow my provider to discuss my care with, or share a copy of, this agreement with any other treating provider, pharmacy or emergency room where I receive care.     I have read this agreement and have asked questions about anything I did not understand.    ________________________________________________________  Patient Signature - Haley Dejesus     ___________________                   Date     ________________________________________________________  Provider Signature - Issa Mendoza MD        ___________________                   Date     ________________________________________________________  Witness Signature (required if provider not present while patient signing)          ___________________                   Date

## 2022-10-07 NOTE — PROGRESS NOTES
Assessment & Plan   Problem List Items Addressed This Visit        Behavioral    Attention deficit hyperactivity disorder (ADHD)    Relevant Medications    amphetamine-dextroamphetamine (ADDERALL) 10 MG tablet      Other Visit Diagnoses     Need for influenza vaccination    -  Primary    Relevant Orders    INFLUENZA VACCINE IM > 6 MONTHS VALENT IIV4 (AFLURIA/FLUZONE) (Completed)    Anxiety and depression        Relevant Medications    FLUoxetine 20 MG tablet    Encounter for contraceptive management, unspecified type        Relevant Medications    levonorgestrel-ethinyl estradiol (AVIANE) 0.1-20 MG-MCG tablet    Tonsillitis        Relevant Orders    Adult ENT  Referral    History of cold sores        Relevant Medications    valACYclovir (VALTREX) 1000 mg tablet         48 minutes spent on the date of the encounter doing chart review, history and exam, documentation and further activities as noted.    Issa Mendoza MD  [facility]    Subjective   Haley is a 26 year old presenting for the following health issues:  Recheck Medication (Check in ) and Pharyngitis (Constant throat pain with flare ups, talk about going to specialist )      HPI   Anxiety  - current meds    - fluoxetine 20mg daily  - feels med is helping, no concerns for side effects  - would like to continue taking med  PHQ-9 score:    PHQ 10/7/2022   PHQ-9 Total Score 10   Q9: Thoughts of better off dead/self-harm past 2 weeks Not at all     CONSUELO-7 SCORE 6/9/2021 10/7/2022   Total Score 10 12     Chronic tonsil inflammation and sore throats  - almost constant for the past few years  - would like to speak to ENT about options for care    Asthma  - current meds    - controller: Advair    - rescue: Albuterol  - ACT today 16  - does vape e-cigs   - not interested in quitting right now    ADHD  - current meds    - Adderall 10mg daily  - doesn't take it every day  - per , last time she picked up her medication was in June of this year  -  appetite stable  - no concerns for BP  - does acknowledge sometimes she has difficulty falling asleep if she takes medication too late in the day  - OK with CSA today    Health Maintenance  - per Jannette, last PAP: 11/2019 NILM  - per patient, she thinks she has had a pap within the past year and a half, out in [place]  - she will recheck her records for this    Birth control  - needs refill today  - denies any concerns on current pill  - does vape as noted above but denies smoking tobacco  - no history of migraines    Cold sores  - is getting these much more frequently  - not interested in daily suppressive therapy at this time  - but would appreciate having extra medication available for new flares      Review of Systems   Constitutional, HEENT, cardiovascular, pulmonary, gi and gu systems are negative, except as otherwise noted.      Objective    /88 (BP Location: Right arm, Patient Position: Sitting, Cuff Size: Adult Regular)   Pulse 81   Temp 97.8  F (36.6  C) (Skin)   Resp 15   Wt 66.7 kg (147 lb)   LMP 10/05/2022 (Exact Date)   SpO2 98%   BMI 23.37 kg/m    Body mass index is 23.37 kg/m .  Physical Exam   GENERAL: healthy, alert and no distress  NECK: no adenopathy, no asymmetry, masses, or scars and thyroid normal to palpation  RESP: lungs clear to auscultation - no rales, rhonchi or wheezes  CV: regular rate and rhythm, normal S1 S2, no S3 or S4, no murmur, click or rub, no peripheral edema and peripheral pulses strong  ABDOMEN: soft, nontender, no hepatosplenomegaly, no masses and bowel sounds normal  MS: no gross musculoskeletal defects noted, no edema

## 2022-10-14 ENCOUNTER — TELEPHONE (OUTPATIENT)
Dept: NURSING | Facility: CLINIC | Age: 27
End: 2022-10-14

## 2022-10-14 NOTE — TELEPHONE ENCOUNTER
Patient was the caller.  Had questions for the billing office.  Transferred her to billing.    Clarissa VELAZQUEZ RN [facility]

## 2022-10-25 ENCOUNTER — MYC MEDICAL ADVICE (OUTPATIENT)
Dept: FAMILY MEDICINE | Facility: CLINIC | Age: 27
End: 2022-10-25

## 2022-10-25 DIAGNOSIS — Z86.19 HISTORY OF COLD SORES: ICD-10-CM

## 2022-10-25 DIAGNOSIS — F90.9 ATTENTION DEFICIT HYPERACTIVITY DISORDER (ADHD), UNSPECIFIED ADHD TYPE: ICD-10-CM

## 2022-10-25 DIAGNOSIS — F32.A ANXIETY AND DEPRESSION: ICD-10-CM

## 2022-10-25 DIAGNOSIS — F41.9 ANXIETY AND DEPRESSION: ICD-10-CM

## 2022-10-25 DIAGNOSIS — Z30.9 ENCOUNTER FOR CONTRACEPTIVE MANAGEMENT, UNSPECIFIED TYPE: ICD-10-CM

## 2022-10-25 RX ORDER — VALACYCLOVIR HYDROCHLORIDE 1 G/1
2000 TABLET, FILM COATED ORAL 2 TIMES DAILY
Qty: 4 TABLET | Refills: 4 | Status: SHIPPED | OUTPATIENT
Start: 2022-10-25 | End: 2024-01-12

## 2022-10-25 RX ORDER — LEVONORGESTREL/ETHIN.ESTRADIOL 0.1-0.02MG
1 TABLET ORAL DAILY
Qty: 84 TABLET | Refills: 3 | Status: SHIPPED | OUTPATIENT
Start: 2022-10-25 | End: 2023-06-07

## 2022-10-25 RX ORDER — FLUOXETINE 20 MG/1
20 TABLET, FILM COATED ORAL DAILY
Qty: 90 TABLET | Refills: 1 | Status: SHIPPED | OUTPATIENT
Start: 2022-10-25 | End: 2023-09-20 | Stop reason: ALTCHOICE

## 2022-10-25 RX ORDER — DEXTROAMPHETAMINE SACCHARATE, AMPHETAMINE ASPARTATE, DEXTROAMPHETAMINE SULFATE AND AMPHETAMINE SULFATE 2.5; 2.5; 2.5; 2.5 MG/1; MG/1; MG/1; MG/1
10 TABLET ORAL 2 TIMES DAILY
Qty: 60 TABLET | Refills: 0 | Status: SHIPPED | OUTPATIENT
Start: 2022-10-25 | End: 2023-01-03

## 2022-10-25 NOTE — TELEPHONE ENCOUNTER
Med refilled as requested.     Diagnoses and all orders for this visit:    Anxiety and depression  -     FLUoxetine 20 MG tablet; Take 1 tablet (20 mg) by mouth daily    Encounter for contraceptive management, unspecified type  -     levonorgestrel-ethinyl estradiol (AVIANE) 0.1-20 MG-MCG tablet; Take 1 tablet by mouth daily    History of cold sores  -     valACYclovir (VALTREX) 1000 mg tablet; Take 2 tablets (2,000 mg) by mouth 2 times daily    Attention deficit hyperactivity disorder (ADHD), unspecified ADHD type  -     amphetamine-dextroamphetamine (ADDERALL) 10 MG tablet; Take 1 tablet (10 mg) by mouth 2 times daily      Issa Mendoza MD  12:57 PM, October 25, 2022

## 2022-10-25 NOTE — TELEPHONE ENCOUNTER
Spoke with pt who states she still has not received any of her prescriptions that were sent at her 10/7 office visit with Dr. Mendoza. Called pharmacy for more information and a recording states that the store is temporarily closed. E-scribed medication were not bounced back so our clinic was unaware there was a transmission failure. Spoke with [facility] rep who states next closest store is [address], [place], [place]. LVM with pt requesting call back whether this is an acceptable alternative pharmacy.    Guido BUI, RN  10/25/22 11:52 AM

## 2022-11-08 NOTE — TELEPHONE ENCOUNTER
FUTURE VISIT INFORMATION      FUTURE VISIT INFORMATION:    Date: 1/13/23    Time: 10:30am    [place] [facility] [place]:    Referring provider:  Issa Mendoza MD    Referring providers clinic:  [facility]    Reason for visit/diagnosis  appt per pt / Tonsillitis / referred by Issa Mendoza MD / med recs in epic / csc confirmed    RECORDS REQUESTED FROM:       Clinic name Comments Records Status Imaging Status   [facility] 10/7/22- note from Issa Mendoza MD Epic

## 2022-11-30 ENCOUNTER — TELEPHONE (OUTPATIENT)
Dept: OTOLARYNGOLOGY | Facility: CLINIC | Age: 27
End: 2022-11-30

## 2022-11-30 NOTE — TELEPHONE ENCOUNTER
LVM for patient regarding rescheduled appointment due to provider is out of clinic. Rescheduled patient for first available and sent new reminder letter to address in Chart. Provided new appointment information and ENT Clinic number for any schedule conflict.

## 2022-12-02 NOTE — TELEPHONE ENCOUNTER
FUTURE VISIT INFORMATION      FUTURE VISIT INFORMATION:    Date: 2/24/23    Time: 10:30am    Location: [facility]  REFERRAL INFORMATION:    Referring provider:  Issa Mendoza MD    Referring providers clinic:  [facility]    Reason for visit/diagnosis  appt per pt / Tonsillitis / referred by Issa Mendoza MD / med recs in epic / csc confirmed     RECORDS REQUESTED FROM:         Clinic name Comments Records Status Imaging Status   [facility] 10/7/22- note from Issa Mendoza MD Epic

## 2023-01-03 ENCOUNTER — MYC REFILL (OUTPATIENT)
Dept: FAMILY MEDICINE | Facility: CLINIC | Age: 28
End: 2023-01-03

## 2023-01-03 DIAGNOSIS — F90.9 ATTENTION DEFICIT HYPERACTIVITY DISORDER (ADHD), UNSPECIFIED ADHD TYPE: ICD-10-CM

## 2023-01-04 RX ORDER — DEXTROAMPHETAMINE SACCHARATE, AMPHETAMINE ASPARTATE, DEXTROAMPHETAMINE SULFATE AND AMPHETAMINE SULFATE 2.5; 2.5; 2.5; 2.5 MG/1; MG/1; MG/1; MG/1
10 TABLET ORAL 2 TIMES DAILY
Qty: 60 TABLET | Refills: 0 | Status: SHIPPED | OUTPATIENT
Start: 2023-01-04 | End: 2023-04-11

## 2023-01-04 NOTE — TELEPHONE ENCOUNTER
Amphetamine-dextroamphetamine (Adderall) 10 mg    Last Office Visit: 10/7/22  [facility] Appointments: None  Medication last refilled: 10/25/22 #60 with 0 refill(s)     verified - last fill date: 10/28/22 #60    Routing refill request to provider for review/approval because:  Drug not on the [facility] refill protocol     LISA ChildN, RN, CCM

## 2023-01-04 NOTE — TELEPHONE ENCOUNTER
reviewed. No concerns. Med refilled as requested.     Haley was seen today for refill request.    Diagnoses and all orders for this visit:    Attention deficit hyperactivity disorder (ADHD), unspecified ADHD type  -     amphetamine-dextroamphetamine (ADDERALL) 10 MG tablet; Take 1 tablet (10 mg) by mouth 2 times daily      Issa Mendoza MD  9:49 AM, January 4, 2023

## 2023-01-13 ENCOUNTER — PRE VISIT (OUTPATIENT)
Dept: OTOLARYNGOLOGY | Facility: CLINIC | Age: 28
End: 2023-01-13

## 2023-02-22 ENCOUNTER — TELEPHONE (OUTPATIENT)
Dept: FAMILY MEDICINE | Facility: CLINIC | Age: 28
End: 2023-02-22

## 2023-02-22 NOTE — TELEPHONE ENCOUNTER
Message left for Haley to call clinic to discuss ENT.  MyChart message also sent with alternative ENT providers in the community.  LISA ChildN, RN, [facility]  02/22/23  3:09 PM

## 2023-02-24 ENCOUNTER — PRE VISIT (OUTPATIENT)
Dept: OTOLARYNGOLOGY | Facility: CLINIC | Age: 28
End: 2023-02-24

## 2023-03-03 NOTE — TELEPHONE ENCOUNTER
FUTURE VISIT INFORMATION      FUTURE VISIT INFORMATION:    Date: 6/12/23    Time: 10:30    [place] [facility] [place]ERRAL INFORMATION:    Referring provider:  Issa Mendoza MD    Referring providers clinic:  [facility]    Reason for visit/diagnosis  Per pt/ref Tonsillitis [J03.90], Issa Mendoza MD in [facility].  Location verified.    RECORDS REQUESTED FROM:       Clinic name Comments Records Status Imaging Status   [facility]  10/7/22, 6/22/21, 6/8/21- note with Issa Mendoza MD  epic

## 2023-03-08 NOTE — TELEPHONE ENCOUNTER
Routing Adderall Rx to provider for approval to be sent to new pharmacy as previous pharmacy was unable to fill. Previous pharmacy is temporarily closed.    Guido BUI, RN  10/25/22 12:34 PM     No

## 2023-04-11 DIAGNOSIS — F90.9 ATTENTION DEFICIT HYPERACTIVITY DISORDER (ADHD), UNSPECIFIED ADHD TYPE: ICD-10-CM

## 2023-04-11 RX ORDER — DEXTROAMPHETAMINE SACCHARATE, AMPHETAMINE ASPARTATE, DEXTROAMPHETAMINE SULFATE AND AMPHETAMINE SULFATE 2.5; 2.5; 2.5; 2.5 MG/1; MG/1; MG/1; MG/1
10 TABLET ORAL 2 TIMES DAILY
Qty: 60 TABLET | Refills: 0 | Status: SHIPPED | OUTPATIENT
Start: 2023-04-11 | End: 2023-05-30

## 2023-04-11 NOTE — TELEPHONE ENCOUNTER
Medication requested: amphetamine-dextroamphetamine (ADDERALL) 10 MG tablet  Last office visit: 10/7/22  [facility] appointments: none  Medication last refilled: 1/4/23; 60 + 0 refills, last sold 3/12/23 per   Last qualifying labs: N/A    Routing refill request to provider for review/approval because:  Drug not on the [facility] refill protocol     Guido BUI, RN  04/11/23 9:12 AM

## 2023-04-11 NOTE — TELEPHONE ENCOUNTER
Med refilled as requested.     Haley was seen today for refill request.    Diagnoses and all orders for this visit:    Attention deficit hyperactivity disorder (ADHD), unspecified ADHD type  -     amphetamine-dextroamphetamine (ADDERALL) 10 MG tablet; Take 1 tablet (10 mg) by mouth 2 times daily      Issa Mendoza MD  1:16 PM, April 11, 2023

## 2023-05-30 ENCOUNTER — MYC REFILL (OUTPATIENT)
Dept: FAMILY MEDICINE | Facility: CLINIC | Age: 28
End: 2023-05-30

## 2023-05-30 DIAGNOSIS — F90.9 ATTENTION DEFICIT HYPERACTIVITY DISORDER (ADHD), UNSPECIFIED ADHD TYPE: ICD-10-CM

## 2023-05-30 NOTE — TELEPHONE ENCOUNTER
Amphetamine-dextroamphetamine (Adderall) 10 mg    Last Office Visit: 10/7/22  Future [facility] Appointments: None  Medication last refilled: 4/11/23 #60 with 0 refill(s)     verified - last fill date: 4/14/23 #60    Routing refill request to provider for review/approval because:  Drug not on the [facility] refill protocol     LISA ChildN, RN, CCM

## 2023-05-31 RX ORDER — DEXTROAMPHETAMINE SACCHARATE, AMPHETAMINE ASPARTATE, DEXTROAMPHETAMINE SULFATE AND AMPHETAMINE SULFATE 2.5; 2.5; 2.5; 2.5 MG/1; MG/1; MG/1; MG/1
10 TABLET ORAL 2 TIMES DAILY
Qty: 60 TABLET | Refills: 0 | Status: SHIPPED | OUTPATIENT
Start: 2023-05-31 | End: 2023-08-28

## 2023-05-31 NOTE — TELEPHONE ENCOUNTER
reviewed, no concerns. Med refilled as requested.     Haley was seen today for refill request.    Diagnoses and all orders for this visit:    Attention deficit hyperactivity disorder (ADHD), unspecified ADHD type  -     amphetamine-dextroamphetamine (ADDERALL) 10 MG tablet; Take 1 tablet (10 mg) by mouth 2 times daily      Issa Mendoza MD  7:50 AM, May 31, 2023

## 2023-06-07 DIAGNOSIS — Z30.9 ENCOUNTER FOR CONTRACEPTIVE MANAGEMENT, UNSPECIFIED TYPE: ICD-10-CM

## 2023-06-07 RX ORDER — LEVONORGESTREL/ETHIN.ESTRADIOL 0.1-0.02MG
1 TABLET ORAL DAILY
Qty: 84 TABLET | Refills: 0 | Status: SHIPPED | OUTPATIENT
Start: 2023-06-07 | End: 2023-08-30

## 2023-06-07 NOTE — TELEPHONE ENCOUNTER
Medication requested: levonorgestrel-ethinyl estradiol (AVIANE) 0.1-20 MG-MCG tablet  Last office visit: 10/7/2022  [facility] appointments: none  Medication last refilled: 10/25/2022  Last qualifying labs:     BP Readings from Last 3 Encounters:   10/07/22 131/88   06/22/21 122/85   06/08/21 124/86     Prescription approved per [facility] Refill Protocol.    **Pt requesting refill from different pharmacy. Adjusted refills to reflect remaining refills**    HAO Myrick, RN  06/07/23, 4:04 PM

## 2023-06-12 ENCOUNTER — PRE VISIT (OUTPATIENT)
Dept: OTOLARYNGOLOGY | Facility: CLINIC | Age: 28
End: 2023-06-12

## 2023-06-12 ENCOUNTER — OFFICE VISIT (OUTPATIENT)
Dept: OTOLARYNGOLOGY | Facility: CLINIC | Age: 28
End: 2023-06-12
Payer: COMMERCIAL

## 2023-06-12 ENCOUNTER — PREP FOR PROCEDURE (OUTPATIENT)
Dept: OTOLARYNGOLOGY | Facility: CLINIC | Age: 28
End: 2023-06-12

## 2023-06-12 VITALS
WEIGHT: 142 LBS | HEART RATE: 81 BPM | SYSTOLIC BLOOD PRESSURE: 136 MMHG | BODY MASS INDEX: 22.29 KG/M2 | DIASTOLIC BLOOD PRESSURE: 95 MMHG | HEIGHT: 67 IN

## 2023-06-12 DIAGNOSIS — J03.90 TONSILLITIS: Primary | ICD-10-CM

## 2023-06-12 DIAGNOSIS — J35.01 CHRONIC TONSILLITIS: Primary | ICD-10-CM

## 2023-06-12 DIAGNOSIS — H61.21 IMPACTED CERUMEN OF RIGHT EAR: ICD-10-CM

## 2023-06-12 PROCEDURE — 69210 REMOVE IMPACTED EAR WAX UNI: CPT | Performed by: OTOLARYNGOLOGY

## 2023-06-12 PROCEDURE — 99203 OFFICE O/P NEW LOW 30 MIN: CPT | Mod: 25 | Performed by: OTOLARYNGOLOGY

## 2023-06-12 RX ORDER — DEXAMETHASONE SODIUM PHOSPHATE 4 MG/ML
10 INJECTION, SOLUTION INTRA-ARTICULAR; INTRALESIONAL; INTRAMUSCULAR; INTRAVENOUS; SOFT TISSUE ONCE
Status: CANCELLED | OUTPATIENT
Start: 2023-06-12 | End: 2023-06-12

## 2023-06-12 ASSESSMENT — PAIN SCALES - GENERAL: PAINLEVEL: NO PAIN (0)

## 2023-06-12 NOTE — NURSING NOTE
"Chief Complaint   Patient presents with     Consult     Tonsillitis       Blood pressure (!) 136/95, pulse 81, height 1.702 m (5' 7\"), weight 64.4 kg (142 lb).    Meredith Paz, EMT    "

## 2023-06-12 NOTE — PATIENT INSTRUCTIONS
You were seen in the ENT Clinic today by Dr. Gallagher. If you have any questions or concerns after your appointment, please contact us (see below)     2.   Please return to the clinic 3 weeks after surgery.              How to Contact Us:  Send a [employer] message to your provider. Our team will respond to you via [employer]. Occasionally, we will need to call you to get further information.  For urgent matters (Monday-Friday), call the ENT Clinic: [phone] and speak with a call center team member - they will route your call appropriately.   If you'd like to speak directly with a nurse, please find our contact information below. We do our best to check voicemail frequently throughout the day, and will work to call you back within 1-2 days. For urgent matters, please use the general clinic phone numbers listed above.        Annie STERN LPN  Direct: [phone]           [facility]  Department of Otolaryngology         What to expect after surgery:   1. A low fever (below 101 degrees Fahrenheit or 38.3 degrees Celsius, taken under the tongue).   2. A sore throat that last 7-10 days, or as long as 14 days.   3. Ear, jaw or neck pain. Pain may peak about a week after surgery.   4. Yellow or white-gray tissue where the tonsils were removed.   5. Bad breath for many days as the throat heals. Gentle tooth brushing is allowed. Do not have gargle.   6. A change in the voice. This will go away in about 3 weeks.   7. Snoring: This will usually improve over time.   8. Stuffy nose: This is normal.    Care after surgery:   1. Consume a mechanical soft diet for the first week after surgery. Progress to thicker foods as tolerated. Things such as macaroni, eggs, mashed potatoes, applesauce, cooked cereal, yogurt, etc.   2. Avoid rough or crunchy foods for at least 7 days.   3. Consume plenty of fluids: at least 24-64 ounces per day. Cool or lukewarm liquids may feel better at first. Sports drinks are a good choice. Avoid citrus  juice as this may burn.   4. Popsicles, smoothies, and ICEES are good options to soothe the throat. NO STRAWS.   5. Chewing gum may help increase saliva and ease pain.    Things to avoid:   1. Gargling   2. Avoid rough or crunchy foods for at least 7 days   3. Straws   4. Heavy or strenuous activity for at least 7 days.    Activity:   1. You should avoid heavy or strenuous activity for 7 days.   2. Refrain from work for at least 2 weeks following surgery. You should not return to work or drive if you are still taking prescribed narcotic pain medication.    Pain:   1. Pain may start to get better and then get worse again, often peaking on days 3-7 after surgery. THIS IS COMMON AND EXPECTED.   2. It will hurt to swallow. The more you attempt to swallow, the less it will hurt.   3. You may take prescribed pain medicine as needed. We will tell you how much to take and how often. Expect to take pain medications for at least 7-10 days.   4. After 2 days, you may replace some or all of the prescribe medicine with liquid Tylenol.   5. Talk to your doctor before giving ibuprofen (Motrin, Advil) or other potential blood thinning medications within 10 days following surgery. Some medicines will increase the risk of bleeding.   6. A humidifier may help ease a sore throat. You might also try an ice pack on the throat for 20 minutes. (Place a cloth between the skin and the ice pack).    When to call us:   1. Bleeding: If you have any bleeding, call your clinic right away.    A. Some bleeding after surgery is normal. Expect blood-tinged mucous.  B. The risk for bleeding increases approximately 10 days after surgery when the site eschar-gray patches where tonsils were removed (like a wet scab) begins to fall off.   C. If bleeding happens after clinic hours, go to the emergency room. Bleeding may occur up to 2 weeks after surgery. Most people will spit out the blood. Some people may swallow the blood and then vomit.  2. Fever of  over 101 degrees Fahrenheit or 38.3 degrees Celsius taken under the tongue if the fever lasts more than 48 hours.  3. Nausea, vomiting or constipation. Narcotic medication can cause constipation so increased fluids and a stool softener may help in preventing this.  4. Breathing problems, more than just a stuffy nose, go to the emergency room.    Follow up:   1. Expect to follow up in clinic 3 weeks after surgery.      Surgery Teaching    1. Someone from our scheduling department will call you within approximately one week to get you scheduled with your provider for surgery. If no one has called you in one week, please notify us.    2. You must have a physical exam (called  history and physical ) within 30 days of surgery. You may complete this with your primary care provider.    In some cases we may have you see our Preoperative Assessment Center. If we have expressed this to you, our  will set up your appointment with them when they call to set up your surgery.    3. For same-day surgery, you must arrange for an adult to take you home from the Center. An adult must stay with you for the first 24 hours after surgery. You cannot drive for 24 hours.     4. Ask your doctor what medicines are safe before surgery. For over the counter medications and supplements it is advised that you do NOT TAKE MOTRIN, IBUPROFEN, ASPIRIN, ALEVE, GARLIC SUPPLEMENTS or FISH OIL x 7 days prior to surgery (to prevent excess bleeding and bruising at time of surgery). Tylenol is ok.    5. A few days prior to surgery a nurse will call you to review your health history and instructions for before and after surgery. They will give you your final arrival time based upon your scheduled arrival time for surgery.    6. Call the surgical team if there's any change in your health prior to surgery within 2 weeks of surgery. Fever, body aches, chills. Flu and covid like symptoms.    7. If you drink alcohol, stop drinking alcohol at least 24  hours before surgery.    8. If you smoke, stop or at least cut down on smoking 24 hours before surgery.    9.Take a bath or shower the night before and the morning of surgery (as told by your surgeon). Use an antiseptic soap. If your doctor does not give you special soap, buy Hibiclens or Sara-Stat at the drug store or ask the pharmacist to suggest a brand. You will wash with this from the neck down, washing your hair and face as you would normally.   A. When you are done with your shower please be sure to use clean towels to dry with, have clean linens on your bed, and put on clean clothes each time.   B. DO NOT put on lotion, powder, perfume, deodorant or make-up after bathing.    10. You can eat a normal meal the night before surgery. Do not eat any solid foods or consume any dairy products 8 hours before surgery.     11. You may drink clear liquids up until 2 hours before surgery. Example: water, Gatorade, apple juice and liquids you can see through.    12. At the 2 hour point prior to surgery you should not be ingesting anything more. Your post op team will review any diet limitations you might have and when you can start eating and drinking again after surgery.    After surgery:    DO NOT blow your nose until you are seen back in clinic for your first post op appointment.     If you have to sneeze please do so with your mouth open.    You will feel very congested and find it hard to breath through your nose until your first post op appointment.       If you have any questions before or after surgery please call:    ROBBIE Hameed  [facility]  Department of Otolaryngology  [phone]

## 2023-06-12 NOTE — PROGRESS NOTES
HISTORY OF PRESENT ILLNESS: Haley Dejesus is here to see us today for the first time.  She is a 27-year-old woman who for the last couple of years, she has had recurrent issues with throat discomfort, recurrent tonsillitis, tonsillith formation, tonsillar hypertrophy.  This was not an issue for her in the past, but really started a couple years ago and has been a chronic problem for her since then.  She also had issues with nausea.  She also reports that her right ear has felt sort of plugged up for a while.    PAST MEDICAL HISTORY: Noted and reviewed in the chart.     FAMILY HISTORY: Noted and reviewed in the chart.     PAST SURGICAL HISTORY: Noted and reviewed in the chart.     SOCIAL HISTORY: Noted and reviewed in the chart.     REVIEW OF SYSTEMS:  Pertinent positives and negatives as above.  Otherwise, complete review of systems noted and reviewed in the chart.    PHYSICAL EXAMINATION:  On examination, this is a 27-year-old woman in no acute distress.  Normal mood and affect, normal ability to communicate.  Alert and appropriate.  Head is normocephalic.  Cranial nerve VII is House-Brackmann I/VI bilaterally.  Breathing without difficulty or stridor.  Eyes are anicteric.  Skin of the head and neck appears normal.  Examination of the ears show right ear with obstructive cerumen, which is cleaned out under the microscope with a curette and suction revealing normal tympanic membrane.  Left ear shows normal tympanic membrane.  Oral cavity shows normal tongue, buccal mucosa.  Oropharynx shows 2+ cryptic tonsils.  Palpation of the neck shows no masses, tenderness or lymphadenopathy.  Anterior nasal exam is unremarkable.    ASSESSMENT:  A 27-year-old with chronic tonsillitis and disruptive symptoms associated with this.      PLAN:  We discussed risks and benefits of tonsillectomy.  She is interested in pursuing this.  We will schedule at her convenience.

## 2023-06-19 ENCOUNTER — TELEPHONE (OUTPATIENT)
Dept: OTOLARYNGOLOGY | Facility: CLINIC | Age: 28
End: 2023-06-19
Payer: COMMERCIAL

## 2023-06-19 NOTE — TELEPHONE ENCOUNTER
Called patient to schedule surgery with Dr. Gallaghre. SANCHO, provided call back number, [phone]

## 2023-06-22 NOTE — TELEPHONE ENCOUNTER
Called patient again to schedule surgery with Dr. Gallagher. No answer, lvm.    Emmy Quinn on 6/22/2023 at 10:34 AM

## 2023-06-26 NOTE — TELEPHONE ENCOUNTER
Called patient to schedule surgery with Dr. Gallagher. Patient answered phone and hung up when relaying where I was calling from. Will send letter to patient.    Emmy Quinn on 6/26/2023 at 9:17 AM     Epidermal Autograft Text: The defect edges were debeveled with a #15 scalpel blade.  Given the location of the defect, shape of the defect and the proximity to free margins an epidermal autograft was deemed most appropriate.  Using a sterile surgical marker, the primary defect shape was transferred to the donor site. The epidermal graft was then harvested.  The skin graft was then placed in the primary defect and oriented appropriately.

## 2023-07-06 NOTE — TELEPHONE ENCOUNTER
Despite multiple attempts, have been unable to schedule patient for surgery with Dr. Gallagher. No further attempts will be made by surgery scheduling. Will route to Anne YAN for awareness.    Emmy Quinn on 7/6/2023 at 9:03 AM

## 2023-08-06 ENCOUNTER — HEALTH MAINTENANCE LETTER (OUTPATIENT)
Age: 28
End: 2023-08-06

## 2023-08-17 NOTE — PROGRESS NOTES
"Haley is a 27 year old who is being evaluated via a billable video visit.      How would you like to obtain your AVS? MyChart  If the video visit is dropped, the invitation should be resent by: Text to cell phone: [phone]  Will anyone else be joining your video visit? No          Assessment & Plan     Haley was seen today for follow up.    Diagnoses and all orders for this visit:    Anxiety  -     venlafaxine (EFFEXOR XR) 75 MG 24 hr capsule; Take 1 capsule (75 mg) by mouth daily for 10 days, THEN 2 capsules (150 mg) daily for 30 days.  -     propranolol (INDERAL) 10 MG tablet; Take 1 tablet (10 mg) by mouth 3 times daily     Will stop fluoxetine at this point and switch to effexor as noted above with plan to ramp up over the next 10 days. We did discuss the benefit of medication in combination with counseling. She reports she is currently actively looking for a counselor. I have asked that she contact me regularly during this medication transition.     34 minutes spent on the date of the encounter doing chart review, history and exam, documentation and further activities as noted.      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      Objective         Vitals:  No vitals " were obtained today due to virtual visit.    Physical Exam   GENERAL: Healthy, alert and no distress  EYES: Eyes grossly normal to inspection.  No discharge or erythema, or obvious scleral/conjunctival abnormalities.  RESP: No audible wheeze, cough, or visible cyanosis.  No visible retractions or increased work of breathing.    SKIN: Visible skin clear. No significant rash, abnormal pigmentation or lesions.  NEURO: Cranial nerves grossly intact.  Mentation and speech appropriate for age.  PSYCH: Mentation appears normal, affect normal, emotional, judgement and insight intact, normal speech and appearance well-groomed.          Video-Visit Details    Type of service:  Video Visit     Originating Location (pt. Location): Home    Distant Location (provider location):  On-site  Platform used for Video Visit: [employer]

## 2023-08-18 ENCOUNTER — VIRTUAL VISIT (OUTPATIENT)
Dept: FAMILY MEDICINE | Facility: CLINIC | Age: 28
End: 2023-08-18
Payer: COMMERCIAL

## 2023-08-18 DIAGNOSIS — F41.9 ANXIETY: Primary | ICD-10-CM

## 2023-08-18 RX ORDER — VENLAFAXINE HYDROCHLORIDE 75 MG/1
CAPSULE, EXTENDED RELEASE ORAL
Qty: 70 CAPSULE | Refills: 0 | Status: SHIPPED | OUTPATIENT
Start: 2023-08-18 | End: 2023-09-20 | Stop reason: DRUGHIGH

## 2023-08-18 RX ORDER — PROPRANOLOL HYDROCHLORIDE 10 MG/1
10 TABLET ORAL 3 TIMES DAILY
Qty: 90 TABLET | Refills: 0 | Status: SHIPPED | OUTPATIENT
Start: 2023-08-18 | End: 2024-01-12

## 2023-08-18 ASSESSMENT — PATIENT HEALTH QUESTIONNAIRE - PHQ9
10. IF YOU CHECKED OFF ANY PROBLEMS, HOW DIFFICULT HAVE THESE PROBLEMS MADE IT FOR YOU TO DO YOUR WORK, TAKE CARE OF THINGS AT HOME, OR GET ALONG WITH OTHER PEOPLE: EXTREMELY DIFFICULT
SUM OF ALL RESPONSES TO PHQ QUESTIONS 1-9: 18
SUM OF ALL RESPONSES TO PHQ QUESTIONS 1-9: 18

## 2023-08-18 ASSESSMENT — ANXIETY QUESTIONNAIRES
1. FEELING NERVOUS, ANXIOUS, OR ON EDGE: MORE THAN HALF THE DAYS
GAD7 TOTAL SCORE: 11
4. TROUBLE RELAXING: MORE THAN HALF THE DAYS
7. FEELING AFRAID AS IF SOMETHING AWFUL MIGHT HAPPEN: NOT AT ALL
6. BECOMING EASILY ANNOYED OR IRRITABLE: SEVERAL DAYS
3. WORRYING TOO MUCH ABOUT DIFFERENT THINGS: MORE THAN HALF THE DAYS
GAD7 TOTAL SCORE: 11
5. BEING SO RESTLESS THAT IT IS HARD TO SIT STILL: MORE THAN HALF THE DAYS
IF YOU CHECKED OFF ANY PROBLEMS ON THIS QUESTIONNAIRE, HOW DIFFICULT HAVE THESE PROBLEMS MADE IT FOR YOU TO DO YOUR WORK, TAKE CARE OF THINGS AT HOME, OR GET ALONG WITH OTHER PEOPLE: EXTREMELY DIFFICULT
2. NOT BEING ABLE TO STOP OR CONTROL WORRYING: MORE THAN HALF THE DAYS

## 2023-08-28 ENCOUNTER — MYC REFILL (OUTPATIENT)
Dept: FAMILY MEDICINE | Facility: CLINIC | Age: 28
End: 2023-08-28

## 2023-08-28 DIAGNOSIS — F90.9 ATTENTION DEFICIT HYPERACTIVITY DISORDER (ADHD), UNSPECIFIED ADHD TYPE: ICD-10-CM

## 2023-08-28 RX ORDER — DEXTROAMPHETAMINE SACCHARATE, AMPHETAMINE ASPARTATE, DEXTROAMPHETAMINE SULFATE AND AMPHETAMINE SULFATE 2.5; 2.5; 2.5; 2.5 MG/1; MG/1; MG/1; MG/1
10 TABLET ORAL 2 TIMES DAILY
Qty: 60 TABLET | Refills: 0 | Status: SHIPPED | OUTPATIENT
Start: 2023-08-28 | End: 2023-11-10

## 2023-08-28 NOTE — TELEPHONE ENCOUNTER
Amphetamine-dextroamphetamine (Adderall) 10 mg    Last Office Visit: 8/18/23  Future [facility] Appointments: None  Medication last refilled: 5/31/23 #60 with 0 refill(s)     verified - last fill date: 7/18/23 #60    Routing refill request to provider for review/approval because:  Drug not on the G refill protocol     LISA ChildN, RN, CCM

## 2023-08-28 NOTE — TELEPHONE ENCOUNTER
reviewed, no concerns. Med refilled as requested.     Haley was seen today for refill request.    Diagnoses and all orders for this visit:    Attention deficit hyperactivity disorder (ADHD), unspecified ADHD type  -     amphetamine-dextroamphetamine (ADDERALL) 10 MG tablet; Take 1 tablet (10 mg) by mouth 2 times daily      Issa Mendoza MD  2:47 PM, August 28, 2023

## 2023-08-30 DIAGNOSIS — Z30.9 ENCOUNTER FOR CONTRACEPTIVE MANAGEMENT, UNSPECIFIED TYPE: ICD-10-CM

## 2023-08-30 RX ORDER — LEVONORGESTREL/ETHIN.ESTRADIOL 0.1-0.02MG
1 TABLET ORAL DAILY
Qty: 84 TABLET | Refills: 0 | Status: SHIPPED | OUTPATIENT
Start: 2023-08-30 | End: 2023-11-28

## 2023-08-30 NOTE — TELEPHONE ENCOUNTER
Medication requested: levonorgestrel-ethinyl estradiol (AVIANE) 0.1-20 MG-MCG tablet   Last office visit: 10/7/22  [facility] appointments: none  Medication last refilled: 6/7/23; 84 + 0 refills  Last qualifying labs: N/A    Prescription approved per [facility] Refill Protocol.    Guido BUI, RN  08/30/23 11:21 AM

## 2023-09-19 DIAGNOSIS — F41.9 ANXIETY: Primary | ICD-10-CM

## 2023-09-19 RX ORDER — VENLAFAXINE HYDROCHLORIDE 75 MG/1
CAPSULE, EXTENDED RELEASE ORAL
Qty: 70 CAPSULE | Refills: 0 | Status: CANCELLED | OUTPATIENT
Start: 2023-09-19 | End: 2023-10-28

## 2023-09-20 ENCOUNTER — MYC MEDICAL ADVICE (OUTPATIENT)
Dept: FAMILY MEDICINE | Facility: CLINIC | Age: 28
End: 2023-09-20

## 2023-09-20 DIAGNOSIS — F41.9 ANXIETY AND DEPRESSION: Primary | ICD-10-CM

## 2023-09-20 DIAGNOSIS — F32.A ANXIETY AND DEPRESSION: Primary | ICD-10-CM

## 2023-09-20 RX ORDER — VENLAFAXINE HYDROCHLORIDE 150 MG/1
150 CAPSULE, EXTENDED RELEASE ORAL DAILY
Qty: 30 CAPSULE | Refills: 0 | Status: SHIPPED | OUTPATIENT
Start: 2023-09-20 | End: 2024-01-17

## 2023-09-20 NOTE — TELEPHONE ENCOUNTER
Venlafaxine (Effexor XR) 75 mg     Last Office Visit: 8/18/23  Future [facility] Appointments: None  Medication last refilled: 8/18/23 #70 with 0 refill(s)    Vital Signs Systolic Diastolic   6/12/23 136 95   10/7/22 131 88   6/8/21 124 86     Required labs per protocol:    LAB REF RANGE 6/8/21   Creatinine 0.67-1.17 mg/dL 1.1     Medication is being filled for 1 time refill only due to:  Patient needs labs due for BMP.    [employer] message sent to patient to call and schedule a lab appointment.    Yudith Mullins, LISAN, RN, CCM

## 2023-10-11 ENCOUNTER — TELEPHONE (OUTPATIENT)
Dept: FAMILY MEDICINE | Facility: CLINIC | Age: 28
End: 2023-10-11

## 2023-11-10 DIAGNOSIS — F90.9 ATTENTION DEFICIT HYPERACTIVITY DISORDER (ADHD), UNSPECIFIED ADHD TYPE: ICD-10-CM

## 2023-11-10 RX ORDER — DEXTROAMPHETAMINE SACCHARATE, AMPHETAMINE ASPARTATE, DEXTROAMPHETAMINE SULFATE AND AMPHETAMINE SULFATE 2.5; 2.5; 2.5; 2.5 MG/1; MG/1; MG/1; MG/1
10 TABLET ORAL 2 TIMES DAILY
Qty: 60 TABLET | Refills: 0 | Status: SHIPPED | OUTPATIENT
Start: 2023-11-10 | End: 2023-12-12

## 2023-11-10 NOTE — TELEPHONE ENCOUNTER
reviewed, no concerns. Med refilled as requested.    Haley was seen today for refill request.    Diagnoses and all orders for this visit:    Attention deficit hyperactivity disorder (ADHD), unspecified ADHD type  -     amphetamine-dextroamphetamine (ADDERALL) 10 MG tablet; Take 1 tablet (10 mg) by mouth 2 times daily      Issa Mendoza MD  4:19 PM, November 10, 2023

## 2023-11-10 NOTE — TELEPHONE ENCOUNTER
Amphetamine-dextroamphetamine (Adderall) 10 mg    Last Office Visit: 8/18/23  Future [facility] Appointments: None  Medication last refilled: 8/28/23 #60 with 0 refill(s)     verified - last fill date: 9/20/23 #60    Routing refill request to provider for review/approval because:  Drug not on the G refill protocol     LISA ChildN, RN, CCM

## 2023-11-17 ENCOUNTER — LAB (OUTPATIENT)
Dept: LAB | Facility: CLINIC | Age: 28
End: 2023-11-17
Payer: COMMERCIAL

## 2023-11-17 DIAGNOSIS — R03.0 ELEVATED BP WITHOUT DIAGNOSIS OF HYPERTENSION: Primary | ICD-10-CM

## 2023-11-17 LAB
ANION GAP SERPL CALCULATED.3IONS-SCNC: 10 MMOL/L (ref 7–15)
BUN SERPL-MCNC: 11.8 MG/DL (ref 6–20)
CALCIUM SERPL-MCNC: 9.1 MG/DL (ref 8.6–10)
CHLORIDE SERPL-SCNC: 103 MMOL/L (ref 98–107)
CREAT SERPL-MCNC: 0.94 MG/DL (ref 0.51–0.95)
DEPRECATED HCO3 PLAS-SCNC: 26 MMOL/L (ref 22–29)
EGFRCR SERPLBLD CKD-EPI 2021: 85 ML/MIN/1.73M2
GLUCOSE SERPL-MCNC: 99 MG/DL (ref 70–99)
POTASSIUM SERPL-SCNC: 3.5 MMOL/L (ref 3.4–5.3)
SODIUM SERPL-SCNC: 139 MMOL/L (ref 135–145)

## 2023-11-28 DIAGNOSIS — Z30.9 ENCOUNTER FOR CONTRACEPTIVE MANAGEMENT, UNSPECIFIED TYPE: ICD-10-CM

## 2023-11-29 RX ORDER — LEVONORGESTREL/ETHIN.ESTRADIOL 0.1-0.02MG
1 TABLET ORAL DAILY
Qty: 28 TABLET | Refills: 0 | Status: SHIPPED | OUTPATIENT
Start: 2023-11-29 | End: 2024-03-10

## 2023-11-29 NOTE — TELEPHONE ENCOUNTER
Medication requested: levonorgestrel-ethinyl estradiol (AVIANE) 0.1-20 MG-MCG tablet   Last office visit: 10/7/22  [facility] appointments: none  Medication last refilled: 8/30/23; 84 + 0 refills  Last qualifying labs: N/A    Medication is being filled for 1 time refill only due to:  Patient needs to be seen because it has been more than one year since last visit.    Message sent to pt to schedule physical with Dr. Mendoza.    Guido BUI, RN  11/29/23 4:04 PM

## 2023-12-12 ENCOUNTER — MYC REFILL (OUTPATIENT)
Dept: FAMILY MEDICINE | Facility: CLINIC | Age: 28
End: 2023-12-12

## 2023-12-12 DIAGNOSIS — F90.9 ATTENTION DEFICIT HYPERACTIVITY DISORDER (ADHD), UNSPECIFIED ADHD TYPE: ICD-10-CM

## 2023-12-12 RX ORDER — DEXTROAMPHETAMINE SACCHARATE, AMPHETAMINE ASPARTATE, DEXTROAMPHETAMINE SULFATE AND AMPHETAMINE SULFATE 2.5; 2.5; 2.5; 2.5 MG/1; MG/1; MG/1; MG/1
10 TABLET ORAL 2 TIMES DAILY
Qty: 60 TABLET | Refills: 0 | Status: SHIPPED | OUTPATIENT
Start: 2023-12-12 | End: 2024-03-31

## 2023-12-12 NOTE — TELEPHONE ENCOUNTER
Amphetamine-dextroamphetamine (Adderall) 10 mg    Last Office Visit: 8/18/23  [facility] Appointments: None  Medication last refilled: 11/10/23 #60 with 0 refill(s)     verified - last fill date: 11/13/23 #60    Routing refill request to provider for review/approval because:  Drug not on the G refill protocol     LISA ChildN, RN, CCM

## 2023-12-12 NOTE — TELEPHONE ENCOUNTER
reviewed, no concerns. Med refilled as requested.    Haley was seen today for refill request.    Diagnoses and all orders for this visit:    Attention deficit hyperactivity disorder (ADHD), unspecified ADHD type  -     amphetamine-dextroamphetamine (ADDERALL) 10 MG tablet; Take 1 tablet (10 mg) by mouth 2 times daily      Issa Mendoza MD  2:23 PM, December 12, 2023

## 2023-12-26 ENCOUNTER — MYC REFILL (OUTPATIENT)
Dept: FAMILY MEDICINE | Facility: CLINIC | Age: 28
End: 2023-12-26

## 2023-12-26 DIAGNOSIS — F90.9 ATTENTION DEFICIT HYPERACTIVITY DISORDER (ADHD), UNSPECIFIED ADHD TYPE: ICD-10-CM

## 2023-12-26 RX ORDER — DEXTROAMPHETAMINE SACCHARATE, AMPHETAMINE ASPARTATE, DEXTROAMPHETAMINE SULFATE AND AMPHETAMINE SULFATE 2.5; 2.5; 2.5; 2.5 MG/1; MG/1; MG/1; MG/1
10 TABLET ORAL 2 TIMES DAILY
Qty: 60 TABLET | Refills: 0 | Status: CANCELLED | OUTPATIENT
Start: 2023-12-26

## 2023-12-27 RX ORDER — DEXTROAMPHETAMINE SACCHARATE, AMPHETAMINE ASPARTATE, DEXTROAMPHETAMINE SULFATE AND AMPHETAMINE SULFATE 5; 5; 5; 5 MG/1; MG/1; MG/1; MG/1
10 TABLET ORAL 2 TIMES DAILY
Qty: 30 TABLET | Refills: 0 | Status: SHIPPED | OUTPATIENT
Start: 2023-12-27 | End: 2024-02-05

## 2023-12-27 NOTE — TELEPHONE ENCOUNTER
Amphetamine-dextroamphetamine (Adderall) 10 mg    Last Office Visit: 8/18/23  Future [facility] Appointments: None  Medication last refilled: 12/12/23 #60 with 0 refill(s)     verified - last fill date: 11/13/23 #60    Routing refill request to provider for review/approval because:  Drug not on the G refill protocol     Patient Comment: Out of stock at all local [facility] (with no estimation of when it will be replenished). Would like to request 20 mg IR tablets to new pharmacy.     Yudith Mullins, BSN, RN, CCM

## 2023-12-27 NOTE — TELEPHONE ENCOUNTER
Agreed to alternative dosing Rx as noted due to medication shortage.     Haley was seen today for refill request.    Diagnoses and all orders for this visit:    Attention deficit hyperactivity disorder (ADHD), unspecified ADHD type  -     amphetamine-dextroamphetamine (ADDERALL) 20 MG tablet; Take 0.5 tablets (10 mg) by mouth 2 times daily      Issa Mendoza MD  5:49 PM, December 27, 2023

## 2024-01-12 ENCOUNTER — OFFICE VISIT (OUTPATIENT)
Dept: FAMILY MEDICINE | Facility: CLINIC | Age: 29
End: 2024-01-12
Payer: COMMERCIAL

## 2024-01-12 VITALS
OXYGEN SATURATION: 98 % | WEIGHT: 147.5 LBS | HEART RATE: 88 BPM | BODY MASS INDEX: 23.1 KG/M2 | SYSTOLIC BLOOD PRESSURE: 117 MMHG | TEMPERATURE: 97.6 F | DIASTOLIC BLOOD PRESSURE: 78 MMHG

## 2024-01-12 DIAGNOSIS — K21.00 GASTROESOPHAGEAL REFLUX DISEASE WITH ESOPHAGITIS, UNSPECIFIED WHETHER HEMORRHAGE: ICD-10-CM

## 2024-01-12 DIAGNOSIS — Z86.19 HISTORY OF COLD SORES: ICD-10-CM

## 2024-01-12 DIAGNOSIS — N92.6 IRREGULAR PERIODS: ICD-10-CM

## 2024-01-12 DIAGNOSIS — F41.9 ANXIETY: Primary | ICD-10-CM

## 2024-01-12 RX ORDER — PROPRANOLOL HYDROCHLORIDE 10 MG/1
10 TABLET ORAL 3 TIMES DAILY PRN
Qty: 90 TABLET | Refills: 3 | Status: SHIPPED | OUTPATIENT
Start: 2024-01-12

## 2024-01-12 RX ORDER — OMEPRAZOLE 40 MG/1
40 CAPSULE, DELAYED RELEASE ORAL DAILY
Qty: 30 CAPSULE | Refills: 1 | Status: SHIPPED | OUTPATIENT
Start: 2024-01-12

## 2024-01-12 RX ORDER — VENLAFAXINE HYDROCHLORIDE 75 MG/1
75 CAPSULE, EXTENDED RELEASE ORAL DAILY
Qty: 30 CAPSULE | Refills: 1 | Status: SHIPPED | OUTPATIENT
Start: 2024-01-12 | End: 2024-08-07

## 2024-01-12 RX ORDER — POLYETHYLENE GLYCOL 3350 17 G/17G
1 POWDER, FOR SOLUTION ORAL DAILY
COMMUNITY
Start: 2023-12-23 | End: 2024-03-11

## 2024-01-12 RX ORDER — VALACYCLOVIR HYDROCHLORIDE 1 G/1
2000 TABLET, FILM COATED ORAL 2 TIMES DAILY
Qty: 4 TABLET | Refills: 4 | Status: SHIPPED | OUTPATIENT
Start: 2024-01-12

## 2024-01-12 RX ORDER — ONDANSETRON 4 MG/1
4 TABLET, FILM COATED ORAL
COMMUNITY
Start: 2023-12-16 | End: 2024-03-10

## 2024-01-12 NOTE — PROGRESS NOTES
Assessment & Plan   Problem List Items Addressed This Visit          Other    Anxiety - Primary    Relevant Medications    venlafaxine (EFFEXOR XR) 75 MG 24 hr capsule    propranolol (INDERAL) 10 MG tablet     Other Visit Diagnoses       History of cold sores        Relevant Medications    valACYclovir (VALTREX) 1000 mg tablet    Gastroesophageal reflux disease with esophagitis, unspecified whether hemorrhage        Relevant Medications    omeprazole (PRILOSEC) 40 MG DR capsule    Irregular periods              Complex, multifaceted issues discussed today. Strong suspicion that stress is playing a significant role in all of these issues. Will increase Effexor as noted and monitor for safety and efficacy. Discussed GI referral but encouraged Haley to contact [facility] for a more prompt response. Discussed referral to OBGYN. Suggested Haley contact Dr. Sana Garcia.     Encouraged Haley to follow up with me at her convenience for further suggestions.     34 minutes spent on the date of the encounter doing chart review, history and exam, documentation and further activities as noted.      MD NEEMA Hamilton [facility]    Subjective   Haley is a 28 year old, presenting for the following health issues:  Hospital F/U (Urgent care follow up - started as norovirus, to severe constipation and gastritis - pt is still having stomach pain, bloating, nausea, acid reflux. Miralax was ineffective, pt did an at-home enema. BM are once every two days.) and Abnormal Bleeding Problem (Pt is also on her third week of her period while still taking the pill consistently; this is the third time this has happened. As well as a sudden appearance of painful cystic acne. Pt would like to test for adrenal function or hormone imbalances. /)      HPI   Hospital Follow UP  Anxiety  - stress at work, in relationship  - Venlafaxine at 150mg daily has really helped  - she is looking for a new counselor, feels like her current  "counselor is \"too judgemental\"  - how much of further issues are related to her anxiety level?    stomach pain, cramping, nausea, GERD  - has had a baseline of GI discomfort for at least the last 6 months, figured it was just the stress of work and life  - contracted Norovirus back in November, diarrhea and vomitting  - thinks that eventually turned into \"gastritis\"  - she reports previously she would have a BM about every other day, more recently it's more like every third day  - but stool remains soft, no pain with BMs, no straining, no hemorrhoids  - she reports she has been using Miralax and monitoring her fiber intake and reports most recently she had to give herself some sort of home enema   - she report a lot of \"burning\" in her upper chest as well as significant bloating  - also reports occasional \"sharp\" pains in lower intestines that are severe but typically resolve quickly    Irregular menses  - she reports she is consistent taking birth control  - typically her periods are regular  - most recently, she reports she has had continued bleeding for the past three weeks  - first week was when her period was supposed to happen  - but she has continued to spot for the past two weeks beyond that  - she also reports she has developed significant acne over the past month or so        Review of Systems   Constitutional, HEENT, cardiovascular, pulmonary, gi and gu systems are negative, except as otherwise noted.      Objective    /78 (BP Location: Right arm, Patient Position: Sitting, Cuff Size: Adult Regular)   Pulse 88   Temp 97.6  F (36.4  C) (Skin)   Wt 66.9 kg (147 lb 8 oz)   LMP 12/22/2023 (Approximate)   SpO2 98%   BMI 23.10 kg/m    Body mass index is 23.1 kg/m .  Physical Exam   GENERAL: healthy, animated, alert and no distress  NECK: no adenopathy, no asymmetry, masses, or scars and thyroid normal to palpation  RESP: lungs clear to auscultation - no rales, rhonchi or wheezes  CV: regular rate " and rhythm, normal S1 S2, no S3 or S4, no murmur, click or rub, no peripheral edema and peripheral pulses strong  ABDOMEN: soft, nontender, moderate bloating, burping, no hepatosplenomegaly, no masses and bowel sounds normal  MS: no gross musculoskeletal defects noted, no edema  SKIN: facial acne

## 2024-01-12 NOTE — NURSING NOTE
Haley  28 year old    Chief Complaint   Patient presents with    Hospital F/U     Urgent care follow up - started as norovirus, to severe constipation and gastritis - pt is still having stomach pain, bloating, nausea, acid reflux. Miralax was ineffective, pt did an at-home enema. BM are once every two days.    Abnormal Bleeding Problem     Pt is also on her third week of her period while still taking the pill consistently; this is the third time this has happened. As well as a sudden appearance of painful cystic acne. Pt would like to test for adrenal function or hormone imbalances.               Blood pressure 117/78, pulse 88, temperature 97.6  F (36.4  C), temperature source Skin, weight 66.9 kg (147 lb 8 oz), last menstrual period 12/22/2023, SpO2 98%. Body mass index is 23.1 kg/m .    Patient Active Problem List   Diagnosis    Anxiety    Asthma    Attention deficit hyperactivity disorder (ADHD)    Elevated BP without diagnosis of hypertension    Stress    Pain in joint, ankle and foot    Posterior tibial tendonitis              Wt Readings from Last 2 Encounters:   01/12/24 66.9 kg (147 lb 8 oz)   06/12/23 64.4 kg (142 lb)       BP Readings from Last 3 Encounters:   01/12/24 117/78   06/12/23 (!) 136/95   10/07/22 131/88                Current Outpatient Medications   Medication    albuterol (PROAIR HFA/PROVENTIL HFA/VENTOLIN HFA) 108 (90 Base) MCG/ACT inhaler    amphetamine-dextroamphetamine (ADDERALL) 10 MG tablet    amphetamine-dextroamphetamine (ADDERALL) 20 MG tablet    fluticasone-salmeterol (ADVAIR) 100-50 MCG/DOSE inhaler    levonorgestrel-ethinyl estradiol (AVIANE) 0.1-20 MG-MCG tablet    ondansetron (ZOFRAN) 4 MG tablet    polyethylene glycol (MIRALAX) 17 GM/Dose powder    propranolol (INDERAL) 10 MG tablet    valACYclovir (VALTREX) 1000 mg tablet    venlafaxine (EFFEXOR XR) 150 MG 24 hr capsule     No current facility-administered medications for this visit.              Social History     Tobacco  "Use    Smoking status: Never    Smokeless tobacco: Never   Vaping Use    Vaping Use: Every day   Substance Use Topics    Alcohol use: Yes    Drug use: Never              Health Maintenance Due   Topic Date Due    ASTHMA ACTION PLAN  Never done    ADVANCE CARE PLANNING  Never done    HEPATITIS B IMMUNIZATION (1 of 3 - 3-dose series) Never done    HIV SCREENING  Never done    HEPATITIS C SCREENING  Never done    PAP  Never done    YEARLY PREVENTIVE VISIT  11/11/2021    ASTHMA CONTROL TEST  04/07/2023    INFLUENZA VACCINE (1) 09/01/2023    COVID-19 Vaccine (3 - 2023-24 season) 09/01/2023            No results found for: \"PAP\"           January 12, 2024 11:17 AM    "

## 2024-01-17 ENCOUNTER — MYC REFILL (OUTPATIENT)
Dept: FAMILY MEDICINE | Facility: CLINIC | Age: 29
End: 2024-01-17

## 2024-01-17 DIAGNOSIS — Z86.19 HISTORY OF COLD SORES: ICD-10-CM

## 2024-01-17 DIAGNOSIS — F41.9 ANXIETY: ICD-10-CM

## 2024-01-17 RX ORDER — VENLAFAXINE HYDROCHLORIDE 150 MG/1
150 CAPSULE, EXTENDED RELEASE ORAL DAILY
Qty: 30 CAPSULE | Refills: 1 | Status: SHIPPED | OUTPATIENT
Start: 2024-01-17 | End: 2024-02-29

## 2024-01-17 RX ORDER — VALACYCLOVIR HYDROCHLORIDE 1 G/1
2000 TABLET, FILM COATED ORAL 2 TIMES DAILY
Qty: 4 TABLET | Refills: 4 | Status: CANCELLED | OUTPATIENT
Start: 2024-01-17

## 2024-01-17 NOTE — TELEPHONE ENCOUNTER
Medication requested: venlafaxine (EFFEXOR XR) 150 MG 24 hr capsule   Last office visit: 1/12/2024  [facility] appointments: none  Medication last refilled: 9/20/2023; 30 + 0 refills  Last qualifying labs:     BP Readings from Last 3 Encounters:   01/12/24 117/78   06/12/23 (!) 136/95   10/07/22 131/88      8/18/2023  4:26 PM   PHQ-9 and GAD7 Scores    PHQ9 TOTAL SCORE    PHQ-9 Total Score    CONSUELO-7 Total Score 11        Component      Latest Ref Rng 11/17/2023  9:16 AM   Creatinine      0.51 - 0.95 mg/dL 0.94      Prescription approved per [facility] Refill Protocol.    HAO Myrick, RN  01/17/24, 3:54 PM

## 2024-01-18 ENCOUNTER — TRANSFERRED RECORDS (OUTPATIENT)
Dept: HEALTH INFORMATION MANAGEMENT | Facility: CLINIC | Age: 29
End: 2024-01-18

## 2024-01-18 LAB
ALT SERPL-CCNC: 11 IU/L (ref 0–32)
AST SERPL-CCNC: 14 IU/L (ref 0–40)
CREATININE (EXTERNAL): 0.95 (ref 0.57–?)
GFR ESTIMATED (EXTERNAL): 84 ML/MIN/1.73
GLUCOSE (EXTERNAL): 90 MG/DL (ref 70–99)
POTASSIUM (EXTERNAL): 3.9 MMOL/L (ref 3.5–5.2)
TSH SERPL-ACNC: 2.69 UIU/ML (ref 0.45–4.5)

## 2024-02-05 ENCOUNTER — MYC REFILL (OUTPATIENT)
Dept: FAMILY MEDICINE | Facility: CLINIC | Age: 29
End: 2024-02-05

## 2024-02-05 DIAGNOSIS — F90.9 ATTENTION DEFICIT HYPERACTIVITY DISORDER (ADHD), UNSPECIFIED ADHD TYPE: ICD-10-CM

## 2024-02-06 RX ORDER — DEXTROAMPHETAMINE SACCHARATE, AMPHETAMINE ASPARTATE, DEXTROAMPHETAMINE SULFATE AND AMPHETAMINE SULFATE 5; 5; 5; 5 MG/1; MG/1; MG/1; MG/1
10 TABLET ORAL 2 TIMES DAILY
Qty: 30 TABLET | Refills: 0 | Status: SHIPPED | OUTPATIENT
Start: 2024-02-06

## 2024-02-06 NOTE — TELEPHONE ENCOUNTER
reviewed, med refilled as requested.     Haley was seen today for refill request.    Diagnoses and all orders for this visit:    Attention deficit hyperactivity disorder (ADHD), unspecified ADHD type  -     amphetamine-dextroamphetamine (ADDERALL) 20 MG tablet; Take 0.5 tablets (10 mg) by mouth 2 times daily      Issa Mendoza MD  4:24 PM, February 6, 2024

## 2024-02-06 NOTE — TELEPHONE ENCOUNTER
Medication requested: amphetamine-dextroamphetamine (ADDERALL) 20 MG tablet   Last office visit: 1/12/2024  [facility] appointments: none  Medication last refilled: 12/27/2023; 30 + 0 refills; last sold #30 on 12/28/24 per   Last qualifying labs: n/a    Routing refill request to provider for review/approval because:  Drug not [place] [facility] [place]     HAO Myrick, RN  02/06/24, 3:33 PM

## 2024-02-16 ENCOUNTER — TRANSFERRED RECORDS (OUTPATIENT)
Dept: HEALTH INFORMATION MANAGEMENT | Facility: CLINIC | Age: 29
End: 2024-02-16

## 2024-02-29 DIAGNOSIS — F41.9 ANXIETY: ICD-10-CM

## 2024-02-29 RX ORDER — VENLAFAXINE HYDROCHLORIDE 150 MG/1
150 CAPSULE, EXTENDED RELEASE ORAL DAILY
Qty: 90 CAPSULE | Refills: 0 | Status: SHIPPED | OUTPATIENT
Start: 2024-02-29

## 2024-02-29 NOTE — TELEPHONE ENCOUNTER
Medication requested: venlafaxine (EFFEXOR XR) 150 MG 24 hr capsule   Last office visit: 1/12/24  [facility] appointments: none  Medication last refilled: 1/17/24; 30 + 0 refill  Last qualifying labs:   BP Readings from Last 3 Encounters:   01/12/24 117/78   06/12/23 (!) 136/95   10/07/22 131/88      8/18/2023  4:26 PM   PHQ-9 / CONSUELO-7 Scores 8/2015 to present    CONSUELO-7 Score DocFlow 11       8/18/2023  4:25 PM   PHQ-9 / CONSUELO-7 Scores 8/2015 to present    PHQ-9 Score DocFlow 18      Prescription approved per [facility] Refill Protocol.    Guido BUI, RN  02/29/24 12:33 PM

## 2024-03-10 ENCOUNTER — MYC REFILL (OUTPATIENT)
Dept: FAMILY MEDICINE | Facility: CLINIC | Age: 29
End: 2024-03-10

## 2024-03-10 DIAGNOSIS — Z30.9 ENCOUNTER FOR CONTRACEPTIVE MANAGEMENT, UNSPECIFIED TYPE: ICD-10-CM

## 2024-03-10 DIAGNOSIS — K21.00 GASTROESOPHAGEAL REFLUX DISEASE WITH ESOPHAGITIS, UNSPECIFIED WHETHER HEMORRHAGE: Primary | ICD-10-CM

## 2024-03-11 RX ORDER — ONDANSETRON 4 MG/1
4 TABLET, FILM COATED ORAL 3 TIMES DAILY PRN
Qty: 30 TABLET | Refills: 1 | Status: SHIPPED | OUTPATIENT
Start: 2024-03-11

## 2024-03-11 RX ORDER — LEVONORGESTREL/ETHIN.ESTRADIOL 0.1-0.02MG
1 TABLET ORAL DAILY
Qty: 84 TABLET | Refills: 3 | Status: SHIPPED | OUTPATIENT
Start: 2024-03-11

## 2024-03-11 NOTE — TELEPHONE ENCOUNTER
Medication requested: levonorgestrel-ethinyl estradiol (AVIANE) 0.1-20 MG-MCG tablet   Last office visit: 1/12/2024  [facility] appointments: none  Medication last refilled: 11/29/2023; 28 + 0 refills  Last qualifying labs: n/a    Prescription approved per [facility] Refill Protocol.    HAO Myrick, RN  03/11/24, 2:44 PM

## 2024-03-11 NOTE — TELEPHONE ENCOUNTER
Med refilled as requested.     Haley was seen today for refill request.    Diagnoses and all orders for this visit:    Gastroesophageal reflux disease with esophagitis, unspecified whether hemorrhage  -     ondansetron (ZOFRAN) 4 MG tablet; Take 1 tablet (4 mg) by mouth 3 times daily as needed for nausea      Issa Mendoza MD  3:01 PM, March 11, 2024

## 2024-03-11 NOTE — TELEPHONE ENCOUNTER
Medication requested: ondansetron (ZOFRAN) 4 MG tablet   Last office visit: 1/12/2024  [facility] appointments: none  Medication last refilled: Historical  Last qualifying labs: n/a    Routing refill request to provider for review/approval because:  Medication is reported/historical    HAO Myrick, RN  03/11/24, 2:46 PM

## 2024-03-31 ENCOUNTER — MYC REFILL (OUTPATIENT)
Dept: FAMILY MEDICINE | Facility: CLINIC | Age: 29
End: 2024-03-31

## 2024-03-31 DIAGNOSIS — F90.9 ATTENTION DEFICIT HYPERACTIVITY DISORDER (ADHD), UNSPECIFIED ADHD TYPE: ICD-10-CM

## 2024-04-01 RX ORDER — DEXTROAMPHETAMINE SACCHARATE, AMPHETAMINE ASPARTATE, DEXTROAMPHETAMINE SULFATE AND AMPHETAMINE SULFATE 2.5; 2.5; 2.5; 2.5 MG/1; MG/1; MG/1; MG/1
10 TABLET ORAL 2 TIMES DAILY
Qty: 60 TABLET | Refills: 0 | Status: SHIPPED | OUTPATIENT
Start: 2024-04-01 | End: 2024-04-02

## 2024-04-01 NOTE — TELEPHONE ENCOUNTER
Medication requested: amphetamine-dextroamphetamine (ADDERALL) 10 MG tablet   Last office visit: 1/12/24  [facility] appointments: none  Medication last refilled: 11/10/23; 60 + 0 refills, last sold 11/13/23 per  (Rx dated 12/12/23 was never received by pharmacy)  Last qualifying labs: N/A    Routing refill request to provider for review/approval because:  Drug not on the [facility] refill protocol     Guido BUI, RN  04/01/24 4:10 PM

## 2024-04-01 NOTE — TELEPHONE ENCOUNTER
reviewed, med refilled as requested.    Haley was seen today for refill request.    Diagnoses and all orders for this visit:    Attention deficit hyperactivity disorder (ADHD), unspecified ADHD type  -     amphetamine-dextroamphetamine (ADDERALL) 10 MG tablet; Take 1 tablet (10 mg) by mouth 2 times daily      Issa Mendoza MD  4:38 PM, April 1, 2024

## 2024-04-02 DIAGNOSIS — F90.9 ATTENTION DEFICIT HYPERACTIVITY DISORDER (ADHD), UNSPECIFIED ADHD TYPE: ICD-10-CM

## 2024-04-02 RX ORDER — DEXTROAMPHETAMINE SACCHARATE, AMPHETAMINE ASPARTATE, DEXTROAMPHETAMINE SULFATE AND AMPHETAMINE SULFATE 2.5; 2.5; 2.5; 2.5 MG/1; MG/1; MG/1; MG/1
10 TABLET ORAL 2 TIMES DAILY
Qty: 60 TABLET | Refills: 0 | Status: SHIPPED | OUTPATIENT
Start: 2024-04-02 | End: 2024-05-05

## 2024-04-02 NOTE — TELEPHONE ENCOUNTER
Rx resubmission as requested.    Haley was seen today for refill request.    Diagnoses and all orders for this visit:    Attention deficit hyperactivity disorder (ADHD), unspecified ADHD type  -     amphetamine-dextroamphetamine (ADDERALL) 10 MG tablet; Take 1 tablet (10 mg) by mouth 2 times daily      Issa Mendoza MD  2:52 PM, April 2, 2024

## 2024-05-05 ENCOUNTER — MYC REFILL (OUTPATIENT)
Dept: FAMILY MEDICINE | Facility: CLINIC | Age: 29
End: 2024-05-05

## 2024-05-05 DIAGNOSIS — F90.9 ATTENTION DEFICIT HYPERACTIVITY DISORDER (ADHD), UNSPECIFIED ADHD TYPE: ICD-10-CM

## 2024-05-06 RX ORDER — DEXTROAMPHETAMINE SACCHARATE, AMPHETAMINE ASPARTATE, DEXTROAMPHETAMINE SULFATE AND AMPHETAMINE SULFATE 2.5; 2.5; 2.5; 2.5 MG/1; MG/1; MG/1; MG/1
10 TABLET ORAL 2 TIMES DAILY
Qty: 60 TABLET | Refills: 0 | Status: SHIPPED | OUTPATIENT
Start: 2024-05-06 | End: 2024-06-25

## 2024-05-06 NOTE — TELEPHONE ENCOUNTER
reviewed, med refilled. Would like to have Haley update her CSA either via MyChart on in clinic at her earliest convenience.     Haley was seen today for refill request.    Diagnoses and all orders for this visit:    Attention deficit hyperactivity disorder (ADHD), unspecified ADHD type  -     amphetamine-dextroamphetamine (ADDERALL) 10 MG tablet; Take 1 tablet (10 mg) by mouth 2 times daily      Issa Mendoza MD  4:47 PM, May 6, 2024

## 2024-05-06 NOTE — TELEPHONE ENCOUNTER
Amphetamine-dextroamphetamine (Adderall) 10 mg    Last Office Visit: 24  [facility] Appointments: None  Medication last refilled: 4/3/24 #60 with 0 refill(s)     verified - last fill date: 4/3/24 #60    CSA on File - , 23    Routing refill request to provider for review/approval because:  Drug not on the [facility] refill protocol     HAO Child, RN, CCM

## 2024-06-25 ENCOUNTER — MYC REFILL (OUTPATIENT)
Dept: FAMILY MEDICINE | Facility: CLINIC | Age: 29
End: 2024-06-25

## 2024-06-25 DIAGNOSIS — F90.9 ATTENTION DEFICIT HYPERACTIVITY DISORDER (ADHD), UNSPECIFIED ADHD TYPE: ICD-10-CM

## 2024-06-25 RX ORDER — DEXTROAMPHETAMINE SACCHARATE, AMPHETAMINE ASPARTATE, DEXTROAMPHETAMINE SULFATE AND AMPHETAMINE SULFATE 2.5; 2.5; 2.5; 2.5 MG/1; MG/1; MG/1; MG/1
10 TABLET ORAL 2 TIMES DAILY
Qty: 60 TABLET | Refills: 0 | Status: SHIPPED | OUTPATIENT
Start: 2024-06-25 | End: 2024-08-21

## 2024-06-25 NOTE — TELEPHONE ENCOUNTER
Medication requested: amphetamine-dextroamphetamine (ADDERALL) 10 MG tablet   Last office visit: 1/12/2024  [facility] appointments: none  Medication last refilled: 5/6/2024; 60 + 0 refills; last sold #60 on 5/12/2024 per   Last qualifying labs: n/a    Routing refill request to provider for review/approval because:  Drug not on the [facility] refill protocol     HAO Myrick, RN  06/25/24, 2:52 PM

## 2024-06-25 NOTE — TELEPHONE ENCOUNTER
reviewed, med refilled.     Haley was seen today for refill request.    Diagnoses and all orders for this visit:    Attention deficit hyperactivity disorder (ADHD), unspecified ADHD type  -     amphetamine-dextroamphetamine (ADDERALL) 10 MG tablet; Take 1 tablet (10 mg) by mouth 2 times daily      Issa Mendoza MD  3:00 PM, June 25, 2024

## 2024-08-07 ENCOUNTER — MYC REFILL (OUTPATIENT)
Dept: FAMILY MEDICINE | Facility: CLINIC | Age: 29
End: 2024-08-07

## 2024-08-07 DIAGNOSIS — F41.9 ANXIETY: ICD-10-CM

## 2024-08-08 RX ORDER — VENLAFAXINE HYDROCHLORIDE 75 MG/1
75 CAPSULE, EXTENDED RELEASE ORAL DAILY
Qty: 30 CAPSULE | Refills: 0 | Status: SHIPPED | OUTPATIENT
Start: 2024-08-08

## 2024-08-08 NOTE — TELEPHONE ENCOUNTER
Medication requested: venlafaxine (EFFEXOR XR) 75 MG 24 hr capsule   Last office visit: 1/12/2024  [facility] appointments: none  Medication last refilled: 1/12/2024; 30 caps + 1 refill  Last qualifying labs:     BP Readings from Last 3 Encounters:   01/12/24 117/78   06/12/23 (!) 136/95   10/07/22 131/88     Medication is being filled for 1 time refill only due to:  Patient needs to be seen because due for mental health follow-up. MC message sent to pt.    HAO Myrick, RN  08/08/24, 4:16 PM

## 2024-08-21 ENCOUNTER — MYC REFILL (OUTPATIENT)
Dept: FAMILY MEDICINE | Facility: CLINIC | Age: 29
End: 2024-08-21

## 2024-08-21 DIAGNOSIS — F90.9 ATTENTION DEFICIT HYPERACTIVITY DISORDER (ADHD), UNSPECIFIED ADHD TYPE: ICD-10-CM

## 2024-08-21 RX ORDER — DEXTROAMPHETAMINE SACCHARATE, AMPHETAMINE ASPARTATE, DEXTROAMPHETAMINE SULFATE AND AMPHETAMINE SULFATE 2.5; 2.5; 2.5; 2.5 MG/1; MG/1; MG/1; MG/1
10 TABLET ORAL 2 TIMES DAILY
Qty: 60 TABLET | Refills: 0 | Status: SHIPPED | OUTPATIENT
Start: 2024-08-21

## 2024-08-21 NOTE — TELEPHONE ENCOUNTER
Medication requested: amphetamine-dextroamphetamine (ADDERALL) 10 MG tablet   Last office visit: 1/12/2024  [facility] appointments: none  Medication last refilled: 6/25/2024; 60 + 0 refills; last sold #60 on 7/1/2024 per   Last qualifying labs: n/a    Routing refill request to provider for review/approval because:  Drug not on the [facility] [place]     HAO Myrick, RN  08/21/24, 2:55 PM

## 2024-08-21 NOTE — TELEPHONE ENCOUNTER
reviewed, med refilled as noted.     Haley was seen today for refill request.    Diagnoses and all orders for this visit:    Attention deficit hyperactivity disorder (ADHD), unspecified ADHD type  -     amphetamine-dextroamphetamine (ADDERALL) 10 MG tablet; Take 1 tablet (10 mg) by mouth 2 times daily.        Issa Mendoza MD  5:41 PM, August 21, 2024

## 2024-09-25 DIAGNOSIS — F41.9 ANXIETY: Primary | ICD-10-CM

## 2024-09-25 RX ORDER — VENLAFAXINE HYDROCHLORIDE 75 MG/1
75 CAPSULE, EXTENDED RELEASE ORAL DAILY
Qty: 30 CAPSULE | Refills: 0 | Status: CANCELLED | OUTPATIENT
Start: 2024-09-25

## 2024-09-25 RX ORDER — VENLAFAXINE HYDROCHLORIDE 75 MG/1
75 CAPSULE, EXTENDED RELEASE ORAL DAILY
Qty: 30 CAPSULE | Refills: 0 | OUTPATIENT
Start: 2024-09-25

## 2024-09-25 NOTE — TELEPHONE ENCOUNTER
Venlafaxine (Effexor XR) 75 mg    Last Office Visit: 1/12/24  [facility] Appointments: None  Medication last refilled: 2/29/24 #90 with 0 refill(s)    PHQ-9 / CONSUELO-7 Scores 10/7/22 8/18/23   CONSUELO-7 Score DocFlow 12 11   PHQ-9 Score DocFlow 10 18     Patient needs appointment.  Has had a yesica refill and was asked to schedule, which she's read and did not schedule.    LISA ChildN, RN, CCM

## 2024-09-28 ENCOUNTER — HEALTH MAINTENANCE LETTER (OUTPATIENT)
Age: 29
End: 2024-09-28

## 2024-10-10 ENCOUNTER — TRANSFERRED RECORDS (OUTPATIENT)
Dept: HEALTH INFORMATION MANAGEMENT | Facility: CLINIC | Age: 29
End: 2024-10-10

## 2024-10-10 DIAGNOSIS — F41.9 ANXIETY: ICD-10-CM

## 2024-10-10 LAB
ALT SERPL-CCNC: 15 IU/L (ref 0–32)
AST SERPL-CCNC: 16 IU/L (ref 0–40)

## 2024-10-10 RX ORDER — VENLAFAXINE HYDROCHLORIDE 75 MG/1
75 CAPSULE, EXTENDED RELEASE ORAL DAILY
Qty: 30 CAPSULE | Refills: 0 | Status: SHIPPED | OUTPATIENT
Start: 2024-10-10 | End: 2024-10-18

## 2024-10-10 NOTE — TELEPHONE ENCOUNTER
Will extend one more yesica refill since pt's appt with Dr. Mendoza was canceled due to staff illness.    Guido BUI, RN  10/10/24 2:54 PM

## 2024-10-18 ENCOUNTER — MYC REFILL (OUTPATIENT)
Dept: FAMILY MEDICINE | Facility: CLINIC | Age: 29
End: 2024-10-18

## 2024-10-18 DIAGNOSIS — K21.00 GASTROESOPHAGEAL REFLUX DISEASE WITH ESOPHAGITIS, UNSPECIFIED WHETHER HEMORRHAGE: ICD-10-CM

## 2024-10-18 DIAGNOSIS — Z86.19 HISTORY OF COLD SORES: ICD-10-CM

## 2024-10-18 DIAGNOSIS — F41.9 ANXIETY: ICD-10-CM

## 2024-10-18 DIAGNOSIS — F90.9 ATTENTION DEFICIT HYPERACTIVITY DISORDER (ADHD), UNSPECIFIED ADHD TYPE: ICD-10-CM

## 2024-10-18 RX ORDER — VALACYCLOVIR HYDROCHLORIDE 1 G/1
2000 TABLET, FILM COATED ORAL 2 TIMES DAILY
Qty: 4 TABLET | Refills: 4 | Status: SHIPPED | OUTPATIENT
Start: 2024-10-18 | End: 2024-11-07

## 2024-10-18 RX ORDER — DEXTROAMPHETAMINE SACCHARATE, AMPHETAMINE ASPARTATE, DEXTROAMPHETAMINE SULFATE AND AMPHETAMINE SULFATE 2.5; 2.5; 2.5; 2.5 MG/1; MG/1; MG/1; MG/1
10 TABLET ORAL 2 TIMES DAILY
Qty: 60 TABLET | Refills: 0 | Status: SHIPPED | OUTPATIENT
Start: 2024-10-18

## 2024-10-18 RX ORDER — VENLAFAXINE HYDROCHLORIDE 75 MG/1
75 CAPSULE, EXTENDED RELEASE ORAL DAILY
Qty: 30 CAPSULE | Refills: 0 | Status: SHIPPED | OUTPATIENT
Start: 2024-10-18

## 2024-10-18 RX ORDER — ONDANSETRON 4 MG/1
4 TABLET, FILM COATED ORAL 3 TIMES DAILY PRN
Qty: 30 TABLET | Refills: 1 | Status: SHIPPED | OUTPATIENT
Start: 2024-10-18 | End: 2024-11-07

## 2024-10-18 NOTE — TELEPHONE ENCOUNTER
reviewed, med refilled as noted.     Haley was seen today for refill request.    Diagnoses and all orders for this visit:    History of cold sores  -     valACYclovir (VALTREX) 1000 mg tablet; Take 2 tablets (2,000 mg) by mouth 2 times daily.    Gastroesophageal reflux disease with esophagitis, unspecified whether hemorrhage  -     ondansetron (ZOFRAN) 4 MG tablet; Take 1 tablet (4 mg) by mouth 3 times daily as needed for nausea.    Attention deficit hyperactivity disorder (ADHD), unspecified ADHD type  -     amphetamine-dextroamphetamine (ADDERALL) 10 MG tablet; Take 1 tablet (10 mg) by mouth 2 times daily.    Anxiety  -     venlafaxine (EFFEXOR XR) 75 MG 24 hr capsule; Take 1 capsule (75 mg) by mouth daily. Take in addition to 150mg dose for total of 225mg daily      Issa Mendoza MD  5:47 PM, October 18, 2024

## 2024-10-18 NOTE — TELEPHONE ENCOUNTER
Medication requested: amphetamine-dextroamphetamine (ADDERALL) 10 MG tablet   Last office visit: 1/12/24  [facility] appointments: 11/7/24  Medication last refilled: 8/21/24; 60 + 0 refills, last sold 8/29/24 per   Last qualifying labs: N/A    Routing refill request to provider for review/approval because:  Drug not [place] [facility] [place]     Medication requested: ondansetron (ZOFRAN) 4 MG tablet   Medication last refilled: 3/11/24; 30 + 3 refills  Last qualifying labs: N/A    Medication requested: valACYclovir (VALTREX) 1000 mg tablet   Medication last refilled: 1/12/24; 4 + 4 refills  Last qualifying labs: N/A    Medication requested: venlafaxine (EFFEXOR XR) 75 MG 24 hr capsule   Medication last refilled: 10/10/24  Last qualifying labs:    8/18/2023  4:26 PM   PHQ-9 / CONSUELO-7 Scores 8/2015 to present    CONSUELO-7 Score DocFlow 11       8/18/2023  4:25 PM   PHQ-9 / CONSUELO-7 Scores 8/2015 to present    PHQ-9 Score DocFlow 18      Prescription approved per [facility] Refill Protocol.    Guido BUI, RN  10/18/24 2:54 PM

## 2024-10-30 ENCOUNTER — MYC REFILL (OUTPATIENT)
Dept: FAMILY MEDICINE | Facility: CLINIC | Age: 29
End: 2024-10-30

## 2024-10-30 DIAGNOSIS — K21.00 GASTROESOPHAGEAL REFLUX DISEASE WITH ESOPHAGITIS, UNSPECIFIED WHETHER HEMORRHAGE: ICD-10-CM

## 2024-10-30 DIAGNOSIS — F90.9 ATTENTION DEFICIT HYPERACTIVITY DISORDER (ADHD), UNSPECIFIED ADHD TYPE: ICD-10-CM

## 2024-10-30 RX ORDER — ONDANSETRON 4 MG/1
4 TABLET, FILM COATED ORAL 3 TIMES DAILY PRN
Qty: 30 TABLET | Refills: 1 | Status: CANCELLED | OUTPATIENT
Start: 2024-10-30

## 2024-10-30 RX ORDER — DEXTROAMPHETAMINE SACCHARATE, AMPHETAMINE ASPARTATE, DEXTROAMPHETAMINE SULFATE AND AMPHETAMINE SULFATE 2.5; 2.5; 2.5; 2.5 MG/1; MG/1; MG/1; MG/1
10 TABLET ORAL 2 TIMES DAILY
Qty: 60 TABLET | Refills: 0 | Status: CANCELLED | OUTPATIENT
Start: 2024-10-30

## 2024-10-30 NOTE — TELEPHONE ENCOUNTER
Amphetamine-dextroamphetamine (Adderall) 10 mg + Ondansetron (Zofran) 4 mg    Last Office Visit: 1/12/24  [facility] Appointments: 11/7/24  Medication last refilled:     Medication last refilled:   10/18/24 #60 with 0 refill(s) - Adderall  10/18/24 #30 with 1 refill(s) - Ondansetron     verified - last fill date:   8/29/24 #60 - Adderall 10 mg    CSA on File - Yes, 10/7/22    Refills declined.  Prescriptions sent 10/18/24.  Adderall hasn't been filled yet and Ondansetron has a refill available.  Will let patient know to contact her pharmacy.    LISA ChildN, RN, CCM

## 2024-11-07 ENCOUNTER — VIRTUAL VISIT (OUTPATIENT)
Dept: FAMILY MEDICINE | Facility: CLINIC | Age: 29
End: 2024-11-07
Payer: COMMERCIAL

## 2024-11-07 DIAGNOSIS — N92.6 IRREGULAR PERIODS: ICD-10-CM

## 2024-11-07 DIAGNOSIS — F90.9 ATTENTION DEFICIT HYPERACTIVITY DISORDER (ADHD), UNSPECIFIED ADHD TYPE: Primary | ICD-10-CM

## 2024-11-07 DIAGNOSIS — F32.81 PMDD (PREMENSTRUAL DYSPHORIC DISORDER): ICD-10-CM

## 2024-11-07 DIAGNOSIS — K21.00 GASTROESOPHAGEAL REFLUX DISEASE WITH ESOPHAGITIS, UNSPECIFIED WHETHER HEMORRHAGE: ICD-10-CM

## 2024-11-07 DIAGNOSIS — Z78.9 ALCOHOL USE: ICD-10-CM

## 2024-11-07 DIAGNOSIS — Z86.19 HISTORY OF COLD SORES: ICD-10-CM

## 2024-11-07 DIAGNOSIS — Z72.0 NICOTINE USE: ICD-10-CM

## 2024-11-07 DIAGNOSIS — L70.0 CYSTIC ACNE: ICD-10-CM

## 2024-11-07 DIAGNOSIS — F41.9 ANXIETY: ICD-10-CM

## 2024-11-07 RX ORDER — VALACYCLOVIR HYDROCHLORIDE 1 G/1
2000 TABLET, FILM COATED ORAL 2 TIMES DAILY
Qty: 4 TABLET | Refills: 4 | Status: SHIPPED | OUTPATIENT
Start: 2024-11-07

## 2024-11-07 RX ORDER — VENLAFAXINE HYDROCHLORIDE 75 MG/1
75 CAPSULE, EXTENDED RELEASE ORAL DAILY
Qty: 30 CAPSULE | Refills: 0 | Status: CANCELLED | OUTPATIENT
Start: 2024-11-07

## 2024-11-07 RX ORDER — DEXTROAMPHETAMINE SACCHARATE, AMPHETAMINE ASPARTATE, DEXTROAMPHETAMINE SULFATE AND AMPHETAMINE SULFATE 5; 5; 5; 5 MG/1; MG/1; MG/1; MG/1
10 TABLET ORAL 2 TIMES DAILY
Qty: 30 TABLET | Refills: 0 | Status: CANCELLED | OUTPATIENT
Start: 2024-11-07

## 2024-11-07 RX ORDER — DEXTROAMPHETAMINE SACCHARATE, AMPHETAMINE ASPARTATE, DEXTROAMPHETAMINE SULFATE AND AMPHETAMINE SULFATE 2.5; 2.5; 2.5; 2.5 MG/1; MG/1; MG/1; MG/1
10 TABLET ORAL 2 TIMES DAILY
Qty: 60 TABLET | Refills: 0 | Status: CANCELLED | OUTPATIENT
Start: 2024-11-07

## 2024-11-07 RX ORDER — ONDANSETRON 4 MG/1
4 TABLET, FILM COATED ORAL 3 TIMES DAILY PRN
Qty: 30 TABLET | Refills: 1 | Status: SHIPPED | OUTPATIENT
Start: 2024-11-07

## 2024-11-07 RX ORDER — VENLAFAXINE HYDROCHLORIDE 150 MG/1
150 CAPSULE, EXTENDED RELEASE ORAL DAILY
Qty: 90 CAPSULE | Refills: 1 | Status: SHIPPED | OUTPATIENT
Start: 2024-11-07

## 2024-11-07 ASSESSMENT — PATIENT HEALTH QUESTIONNAIRE - PHQ9
SUM OF ALL RESPONSES TO PHQ QUESTIONS 1-9: 15
SUM OF ALL RESPONSES TO PHQ QUESTIONS 1-9: 15
10. IF YOU CHECKED OFF ANY PROBLEMS, HOW DIFFICULT HAVE THESE PROBLEMS MADE IT FOR YOU TO DO YOUR WORK, TAKE CARE OF THINGS AT HOME, OR GET ALONG WITH OTHER PEOPLE: VERY DIFFICULT

## 2024-11-07 ASSESSMENT — ANXIETY QUESTIONNAIRES
GAD7 TOTAL SCORE: 15
5. BEING SO RESTLESS THAT IT IS HARD TO SIT STILL: NEARLY EVERY DAY
7. FEELING AFRAID AS IF SOMETHING AWFUL MIGHT HAPPEN: NOT AT ALL
3. WORRYING TOO MUCH ABOUT DIFFERENT THINGS: NEARLY EVERY DAY
6. BECOMING EASILY ANNOYED OR IRRITABLE: SEVERAL DAYS
1. FEELING NERVOUS, ANXIOUS, OR ON EDGE: NEARLY EVERY DAY
8. IF YOU CHECKED OFF ANY PROBLEMS, HOW DIFFICULT HAVE THESE MADE IT FOR YOU TO DO YOUR WORK, TAKE CARE OF THINGS AT HOME, OR GET ALONG WITH OTHER PEOPLE?: VERY DIFFICULT
GAD7 TOTAL SCORE: 15
GAD7 TOTAL SCORE: 15
7. FEELING AFRAID AS IF SOMETHING AWFUL MIGHT HAPPEN: NOT AT ALL
2. NOT BEING ABLE TO STOP OR CONTROL WORRYING: MORE THAN HALF THE DAYS
4. TROUBLE RELAXING: NEARLY EVERY DAY
IF YOU CHECKED OFF ANY PROBLEMS ON THIS QUESTIONNAIRE, HOW DIFFICULT HAVE THESE PROBLEMS MADE IT FOR YOU TO DO YOUR WORK, TAKE CARE OF THINGS AT HOME, OR GET ALONG WITH OTHER PEOPLE: VERY DIFFICULT

## 2024-11-07 ASSESSMENT — ASTHMA QUESTIONNAIRES
QUESTION_5 LAST FOUR WEEKS HOW WOULD YOU RATE YOUR ASTHMA CONTROL: COMPLETELY CONTROLLED
ACT_TOTALSCORE: 24
QUESTION_2 LAST FOUR WEEKS HOW OFTEN HAVE YOU HAD SHORTNESS OF BREATH: NOT AT ALL
ACT_TOTALSCORE: 24
QUESTION_3 LAST FOUR WEEKS HOW OFTEN DID YOUR ASTHMA SYMPTOMS (WHEEZING, COUGHING, SHORTNESS OF BREATH, CHEST TIGHTNESS OR PAIN) WAKE YOU UP AT NIGHT OR EARLIER THAN USUAL IN THE MORNING: ONCE OR TWICE
QUESTION_1 LAST FOUR WEEKS HOW MUCH OF THE TIME DID YOUR ASTHMA KEEP YOU FROM GETTING AS MUCH DONE AT WORK, SCHOOL OR AT HOME: NONE OF THE TIME
QUESTION_4 LAST FOUR WEEKS HOW OFTEN HAVE YOU USED YOUR RESCUE INHALER OR NEBULIZER MEDICATION (SUCH AS ALBUTEROL): NOT AT ALL

## 2024-11-07 NOTE — PROGRESS NOTES
Haley is a 28 year old who is being evaluated via a billable video visit.    How would you like to obtain your AVS? PM Pediatricshart  If the video visit is dropped, the invitation should be resent by: Text to cell phone: [phone]  Will anyone else be joining your video visit? No      Assessment & Plan   Problem List Items Addressed This Visit          Behavioral    Attention deficit hyperactivity disorder (ADHD) - Primary       Other    Anxiety    Relevant Medications    venlafaxine (EFFEXOR XR) 150 MG 24 hr capsule     Other Visit Diagnoses       History of cold sores        Relevant Medications    valACYclovir (VALTREX) 1000 mg tablet    Gastroesophageal reflux disease with esophagitis, unspecified whether hemorrhage        Relevant Medications    ondansetron (ZOFRAN) 4 MG tablet    Nicotine use        Alcohol use        Cystic acne        Irregular periods        PMDD (premenstrual dysphoric disorder)        Relevant Medications    venlafaxine (EFFEXOR XR) 150 MG 24 hr capsule           Complex picture of emotional and physical issues. Plan at this point is to stop her adderall temporarily to see how she feels off this medication. Haley will notify me via [employer] on her status. Depending on how she feels, we could consider further options including reintroducing ADHD med vs Wellbutrin, possibly changing her current SNRI.     Spoke with S here and will forward this note to Yinka to contact Haley abut a possible appointment.     Some suspicion for PCOS as well as PMDD given reported symptoms. I suggested Haley go back on her OCPs and see if that helps with both physical and emotional symptoms. I also placed a referral to OBGYN for possible labs or consideration of U/S imaging.      Chronic meds for cold sores and GERD refilled today.     The longitudinal plan of care for the diagnosis(es)/condition(s) as documented were addressed during this visit. Due to the added complexity in care, I will continue to support  "Haley in the subsequent management and with ongoing continuity of care.    50 minutes spent on the date of the encounter doing chart review, history and exam, documentation and further activities as noted.      Issa Mendoza MD  5:39 PM, November 7, 2024        Subjective   Haley is a 28 year old, presenting for the following health issues:  Recheck Medication (Venlafaxine 75 mg & 150 mg - not seeing \"a ton of results from it\" but is not taking it consistently/Adderall 10 mg & 20 mg - feels like this has negatively affected her anxiety and sleep, discuss other options)    HPI   Med review  ADHD  - thinks her adderall may be contributing to her anxiety and feeling out of control  - she doesn't typically take her adderall on the weekends    Anxiety  - has been dealing with a lot of life stressors, especially over the last month  - currently takes venlafaxine 150mg daily although she reports she will sometimes forget to take this medication for 2 or 3 days at a time  - she's not sure if the venlafaxine is helping  - she has taken SSRIs on and off in the past with uncertain perceived benefits    Addictive behavior  - alcohol, tobacco, cannabis  - has been using much more frequently over the past month  - she wonders if she might benefit from taking bupropion to try and reduce dopamine craving behavior  - she was seeing a therapist, but when she brought up this behavior she was advised by her therapist to consider seeing someone who specializes in this   - mentioned [facility] and she is interested in speaking with [facility]    Hormone concerns  - she stopped her OCP not too long ago  - since then, her periods have become very irregular  - she has also developed cystic acne on her jaw      Review of Systems  Constitutional, HEENT, cardiovascular, pulmonary, gi and gu systems are negative, except as otherwise noted.      Objective           Vitals:  No vitals were obtained today due to virtual visit.    Physical Exam "   GENERAL: alert and no distress  EYES: Eyes grossly normal to inspection.  No discharge or erythema, or obvious scleral/conjunctival abnormalities.  RESP: No audible wheeze, cough, or visible cyanosis.    SKIN: Visible skin clear. No significant rash, abnormal pigmentation or lesions.  NEURO: Cranial nerves grossly intact.  Mentation and speech appropriate for age.  PSYCH: Appropriate affect, tone, and pace of words          Video-Visit Details    Type of service:  Video Visit   Originating Location (pt. Location): Home    Distant Location (provider location):  On-site  Platform used for Video Visit: Brielle  Signed Electronically by: Issa Mendoza MD

## 2024-11-07 NOTE — NURSING NOTE
"Haley  28 year old    Chief Complaint   Patient presents with    Recheck Medication     Venlafaxine 75 mg & 150 mg - not seeing \"a ton of results from it\" but is not taking it consistently  Adderall 10 mg & 20 mg - feels like this has negatively affected her anxiety and sleep, discuss other options            There were no vitals taken for this visit. There is no height or weight on file to calculate BMI.    Patient Active Problem List   Diagnosis    Anxiety    Asthma    Attention deficit hyperactivity disorder (ADHD)    Elevated BP without diagnosis of hypertension    Stress    Pain in joint, ankle and foot    Posterior tibial tendonitis              Wt Readings from Last 2 Encounters:   01/12/24 66.9 kg (147 lb 8 oz)   06/12/23 64.4 kg (142 lb)       BP Readings from Last 3 Encounters:   01/12/24 117/78   06/12/23 (!) 136/95   10/07/22 131/88                Current Outpatient Medications   Medication Sig Dispense Refill    albuterol (PROAIR HFA/PROVENTIL HFA/VENTOLIN HFA) 108 (90 Base) MCG/ACT inhaler Inhale 2 puffs into the lungs every 6 hours      amphetamine-dextroamphetamine (ADDERALL) 10 MG tablet Take 1 tablet (10 mg) by mouth 2 times daily. 60 tablet 0    amphetamine-dextroamphetamine (ADDERALL) 20 MG tablet Take 0.5 tablets (10 mg) by mouth 2 times daily 30 tablet 0    fluticasone-salmeterol (ADVAIR) 100-50 MCG/DOSE inhaler Inhale 1 puff into the lungs every 12 hours      levonorgestrel-ethinyl estradiol (AVIANE) 0.1-20 MG-MCG tablet Take 1 tablet by mouth daily 84 tablet 3    omeprazole (PRILOSEC) 40 MG DR capsule Take 1 capsule (40 mg) by mouth daily 30 capsule 1    ondansetron (ZOFRAN) 4 MG tablet Take 1 tablet (4 mg) by mouth 3 times daily as needed for nausea. 30 tablet 1    propranolol (INDERAL) 10 MG tablet Take 1 tablet (10 mg) by mouth 3 times daily as needed (anxiety) 90 tablet 3    valACYclovir (VALTREX) 1000 mg tablet Take 2 tablets (2,000 mg) by mouth 2 times daily. 4 tablet 4    venlafaxine " "(EFFEXOR XR) 150 MG 24 hr capsule Take 1 capsule (150 mg) by mouth daily 90 capsule 0    venlafaxine (EFFEXOR XR) 75 MG 24 hr capsule Take 1 capsule (75 mg) by mouth daily. Take in addition to 150mg dose for total of 225mg daily 30 capsule 0     No current facility-administered medications for this visit.              Social History     Tobacco Use    Smoking status: Never    Smokeless tobacco: Never   Vaping Use    Vaping status: Every Day   Substance Use Topics    Alcohol use: Yes    Drug use: Never              Health Maintenance Due   Topic Date Due    ASTHMA ACTION PLAN  Never done    ADVANCE CARE PLANNING  Never done    Pneumococcal Vaccine: Pediatrics (0 to 5 Years) and At-Risk Patients (6 to 64 Years) (1 of 2 - PCV) Never done    HIV SCREENING  Never done    HEPATITIS C SCREENING  Never done    HEPATITIS B IMMUNIZATION (1 of 3 - 19+ 3-dose series) Never done    YEARLY PREVENTIVE VISIT  11/11/2021    PAP  11/19/2022    INFLUENZA VACCINE (1) 09/01/2024    COVID-19 Vaccine (3 - 2024-25 season) 09/01/2024            No results found for: \"PAP\"           November 7, 2024 4:39 PM    "

## 2024-11-08 ENCOUNTER — TELEPHONE (OUTPATIENT)
Dept: BEHAVIORAL HEALTH | Facility: CLINIC | Age: 29
End: 2024-11-08

## 2024-11-08 NOTE — TELEPHONE ENCOUNTER
[facility] rec'd referral for pt from PCP, requesting [facility] contact pt to educate about services, answer pt questions, and assist with scheduling if needed.     [facility] contacted pt, no answer, left  encouraging pt to contact [facility] if they have further questions or the clinic to schedule appt if desired.

## 2024-11-12 ASSESSMENT — PATIENT HEALTH QUESTIONNAIRE - PHQ9
SUM OF ALL RESPONSES TO PHQ QUESTIONS 1-9: 11
SUM OF ALL RESPONSES TO PHQ QUESTIONS 1-9: 11
10. IF YOU CHECKED OFF ANY PROBLEMS, HOW DIFFICULT HAVE THESE PROBLEMS MADE IT FOR YOU TO DO YOUR WORK, TAKE CARE OF THINGS AT HOME, OR GET ALONG WITH OTHER PEOPLE: VERY DIFFICULT

## 2024-11-13 ENCOUNTER — OFFICE VISIT (OUTPATIENT)
Dept: BEHAVIORAL HEALTH | Facility: CLINIC | Age: 29
End: 2024-11-13
Payer: COMMERCIAL

## 2024-11-13 DIAGNOSIS — F90.2 ADHD (ATTENTION DEFICIT HYPERACTIVITY DISORDER), COMBINED TYPE: ICD-10-CM

## 2024-11-13 DIAGNOSIS — F12.20 MODERATE CANNABIS USE DISORDER (H): ICD-10-CM

## 2024-11-13 DIAGNOSIS — F10.20 ALCOHOL USE DISORDER, SEVERE, DEPENDENCE (H): ICD-10-CM

## 2024-11-13 DIAGNOSIS — F33.1 MAJOR DEPRESSIVE DISORDER, RECURRENT EPISODE, MODERATE (H): ICD-10-CM

## 2024-11-13 DIAGNOSIS — F41.1 GAD (GENERALIZED ANXIETY DISORDER): Primary | ICD-10-CM

## 2024-11-13 ASSESSMENT — COLUMBIA-SUICIDE SEVERITY RATING SCALE - C-SSRS
2. HAVE YOU ACTUALLY HAD ANY THOUGHTS OF KILLING YOURSELF?: NO
REASONS FOR IDEATION LIFETIME: COMPLETELY TO END OR STOP THE PAIN (YOU COULDN'T GO ON LIVING WITH THE PAIN OR HOW YOU WERE FEELING)
TOTAL  NUMBER OF INTERRUPTED ATTEMPTS LIFETIME: NO
1. HAVE YOU WISHED YOU WERE DEAD OR WISHED YOU COULD GO TO SLEEP AND NOT WAKE UP?: YES
6. HAVE YOU EVER DONE ANYTHING, STARTED TO DO ANYTHING, OR PREPARED TO DO ANYTHING TO END YOUR LIFE?: NO
TOTAL  NUMBER OF ABORTED OR SELF INTERRUPTED ATTEMPTS LIFETIME: NO
ATTEMPT LIFETIME: NO
1. IN THE PAST MONTH, HAVE YOU WISHED YOU WERE DEAD OR WISHED YOU COULD GO TO SLEEP AND NOT WAKE UP?: NO

## 2024-11-13 NOTE — PROGRESS NOTES
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    Clinical Summary:  1. Psychosocial, Cultural and Contextual Factors: , heterosexual, partnered female  .  2. Principal DSM5 Diagnoses  (Sustained by DSM5 Criteria Listed Above):   Attention-Deficit/Hyperactivity Disorder  314.01 (F90.2) Combined presentation  296.32 (F33.1) Major Depressive Disorder, Recurrent Episode, Moderate _  300.02 (F41.1) Generalized Anxiety Disorder  Substance-Related & Addictive Disorders Alcohol Use Disorder   303.90 (F10.20) Severe .  304.30 (F12.20) Cannabis Use Disorder Moderate  . .  3. Other Diagnoses that is relevant to services:     4. Provisional Diagnosis:  history of trauma  5. Prognosis: Expect Improvement, Relieve Acute Symptoms, and Maintain Current Status / Prevent Deterioration.  6. Likely consequences of symptoms if not treated: Continued/worsening symptoms and increased/prolonged functional impairment.  7. Client strengths include:  caring, educated, empathetic, employed, goal-focused, has a previous history of therapy, insightful, intelligent, support of family, friends and providers, supportive, willing to relate to others, and work history .     Recommendations:     1. Plan for Safety and Risk Management:   Safety and Risk: Recommended that patient call 911 or go to the local ED should there be a change in any of  these risk factors.          Report to child / adult protection services was NA.     *Jyothi declined to create a safety plan, stating she has one at home created with her previous therapist.  Jyothi was receptive to [facility] educating her about Crisis Resources/Services and providing written material with contact information.        2. Patient's identified mental health concerns with a cultural influence will be addressed by recognizing history of mental health challenges .     3. Initial Treatment will focus on:    Depressed Mood - sleep, mood, fatigue,   Anxiety - rumination, irritability, physical symptoms  Alcohol / Substance Use - harm reduction, education, coping skill  Attentional Problems - attention, medication task management .     4. Resources/Service Plan:    services are not indicated.   Modifications to assist communication are not indicated.   Additional disability accommodations are not indicated.      5. Collaboration:   Collaboration / coordination of treatment will be initiated with the following  support professionals: primary care physician.      6.  Referrals:   The following referral(s) will be initiated: Outpatient Mental Lul Therapy.       A Release of Information has been obtained for the following:  Pt did not sign ARI's .     Clinical Substantiation/medical necessity for the above recommendations:  .    Jyothi has history of eating disorder, ADHD, anxiety, and depressed mood, denies current eating disorder behaviors, but affirms problematic alcohol and cannabis use, and noting these are often taken to manage anxiety, depression, and ADHD.  Jyothi has history of trauma and SI, no current safety concerns.  Jyothi currently meets criteria for MDD, CONSUELO, ADHD, and AUD/GRACIELA.      Recommended Integrated MI-GRACIELA intensive outpatient treatment, psychiatric medication evaluation (CCPS, Collaborative Care Psychiatry Services), support group attendance (eg [facility], [employer] Recovery, Women for  Sobriety), and outpatient psychotherapy services.  [facility] educated Jyothi recommended services, community support groups, and other resources (apps, books, podcasts, etc).  Jyothi declined integrated MI-GRACIELA IOP, medication evaluation, and support group attendance.  Jyothi was receptive to resources, including apps/books (will try a tracking ike, b/c tracking behaviors has helped in the past) and outpatient psychotherapy services with [facility].        7. GRACIELA:    GRACIELA:  Discussed the general effects of drugs and alcohol on health and well-being, Attend a sober community support program including AA, SMART Recovery, [facility], etc.)., Attempt harm reduction by tracking use, and attend [facility] outpatient treatment program, and explore resources (books, apps, podcasts) to help stop/reduce alcohol and cannabis use.  . Provider educated patient about the effects of chemical use on their health and well being. Recommendations:  Cessation of alcohol and all substances, Sober support group, Integrated MI-GRACIELA outpatient treatment, and learn about resources.       8. Records:   These were reviewed at time of assessment.   Information in this assessment was obtained from the medical record and  provided by patient who is a good historian.    Patient will have open access to their mental health medical record.    9.   Interactive Complexity: No    10. Safety Plan:       Provider Name/ Credentials:  Shawn Ullrich LICSW, LADC  November 13, 2024

## 2024-11-22 NOTE — PROGRESS NOTES
"    [facility]         PATIENT'S NAME: Haley Dejesus  PREFERRED NAME: \"Leana"  PRONOUNS:   she/her    MRN: [id number]  : 1995  ADDRESS: [address] [place] [id number]  ACCT. NUMBER:  [id number]  DATE OF SERVICE: 24  PREFERRED PHONE: [phone]  May we leave a program related message: Yes  EMERGENCY CONTACT: was not obtained  .  SERVICE MODALITY:  In-person      Behavioral Health Clinician Progress Note        Service Type:  Individual   Service Location:  Face to Face in Clinic   Visit Start Time: 1:33 PM  Visit End Time:  2:07 PM   Session Length: 16 - 37    Attendees: Patient   Service Modality: In-person     [facility] Visit Activities (Refresh list every visit): [facility] Only     [place] Diagnostic Assessment: 24  Treatment Plan Review Date: 25  CGI Review Date: 25  Promis 10 Review Date: 25    Clinical Global Impressions  First:  Considering your total clinical experience with this particular patient population, how severe are the patient's symptoms at this time?: 5 (2024 12:43 PM)    Most recent:  No data recorded       DATA:    Extended Session (60+ minutes): No   Interactive Complexity: No   Crisis: No   [facility] Patient: No     Assessments completed prior to visit:   PHQ9:       2021    11:11 AM 10/7/2022    12:58 PM 2023     4:25 PM 2024     2:20 PM 2024     2:43 PM   PHQ-9 SCORE   PHQ-9 Total Score MyChart   18 (Moderately severe depression) 15 (Moderately severe depression) 11 (Moderate depression)   PHQ-9 Total Score 13 10 18 15  11        Patient-reported     GAD7:       2021    11:11 AM 10/7/2022    12:58 PM 2023     4:26 PM 2024     2:20 PM   CONSUELO-7 SCORE   Total Score   11 (moderate anxiety) 15 (severe anxiety)   Total Score 10 12 11 15        Patient-reported     PROMIS 10-Global Health (only subscores and total score):       2024     9:26 AM   PROMIS-10 Scores Only " "  Global Mental Health Score 9    Global Physical Health Score 14    PROMIS TOTAL - SUBSCORES 23        Patient-reported     \"     Reason for Visit/Presenting Concern:  ADHD and Substance Use    Current Stressors / Issues:   [facility] used MI interventions to assess motivation/stage of change, elicit change talk, prompt identification of identification of goals, and engage pt in treatment planning.  When assessing her status, Jyothi reported doing \"OK\", her mood \"has been better and stable\", and she feels more in control and able to make changes.   Jyothi also reported she's been \"dialing back\" some of the alcohol and substance use.  Jyothi reported the following:    Progress/Actions Taken  -reduced amount of alcohol  -reduce the amount of cannabis  -has been more aware of and questioning her use  -\"is this because I'm sad?\"  -\"Is this a default?\"--automatic   -what has she learned?    -Easier to make changes when she   -uses in smaller increments--can notice small change and make adjustments  -Slow myself down  -Talked to work about possibly taking time off---HR was supportive  -Started reading: Dr. Della CORREIA--holistic health  -Talking to bf before the holidays      Continued challenges  -Busy season at work and now feels the need to unplug at the end of the night  -wants to get out of work mode as fast as she can    Additional Action she could take that would be helpful?  -Put one thing in between work and personal time, ie \"A buffer\", so then doesn't immediately reach for alcohol or cannabis  -some sort of movement  -walk dog  -change environment    What will she do in addition between now and next appt?   -\"I think cutting back and tracking more\"   -use tracking ike  -Implement \"Buffer\" time   -walking  -reading   -journal    Goal:  \"That I gained even more control...enough to hold myself accountability and put these thing in my daily routine\"      Therapeutic Interventions:  Motivational Interviewing (MI): " Validated patient's thoughts, feelings and experience. Expressed respect for patient's autonomy in decision making.  Asked open-ended questions to invite patient's self-reflection and self-direction around change and what is important for them in working towards their goals.  Expressed and demonstrated empathy through reflective listening.  Affirmed patient's strengths and abilities. Elicited change talk.  Mindfulness: Worked with patient to identify automatic thoughts and judgments that occur in response to thoughts and emotions.  Provided education and guidance for the development of increased capacity for mindfulness and self-awareness.   Provided psycho-education around developing mindfulness strategies.     Response to treatment interventions:   Patient was receptive to interventions utilized.  Patient was engaged in the therapy process.   Patient's motivation increased.   Patient made a plan for changes in the next week. Patient was able to effectively identify next steps in his/her change process.       Progress on Treatment Objective(s) / Homework:   New Objective established this session - ACTION (Actively working towards change); Intervened by reinforcing change plan / affirming steps taken     Medication Review:   No changes to current psychiatric medication(s)     Medication Compliance:   Yes     Chemical Use Review:  Substance Use: decrease in alcohol  and cannabis .  Patient reports frequency of use daily.  Stage of Change: Action  Provided support and affirmation for steps taken towards sobriety         Tobacco Use: No current tobacco use.       Assessment: Current Emotional / Mental Status (status of significant symptoms):    Risk status (Self / Other harm or suicidal ideation)   Patient has had a history of suicidal ideation: passive, no plan or intent  and self-injurious behavior: scratching arm during middle school    Patient denies current fears or concerns for personal safety.   Patient denies  current or recent suicidal ideation or behaviors.   Patient denies current or recent homicidal ideation or behaviors.   Patient denies current or recent self injurious behavior or ideation.   Patient denies other safety concerns.   Recommended that patient call 911 or go to the local ED should there be a change in any of these risk factors      *Jyothi declined to create a safety plan, stating she has one at home created with her previous therapist.  Jyothi was receptive to [facility] educating her about Crisis Resources/Services and providing written material with contact information.     ASSESSMENT:   Mental Status:     Appearance:   Appropriate    Eye Contact:   Good    Psychomotor Behavior: Normal    Attitude:   Cooperative    Orientation:   All   Speech Rate / Production: Normal/ Responsive Normal    Volume:   Normal    Mood:    Anxious  Depressed    Affect:    Worrisome    Thought Content:  Clear    Thought Form:  Coherent  Logical    Insight:    Good          Diagnoses:      CONSUELO (generalized anxiety disorder)  Major depressive disorder, recurrent episode, moderate (H)  ADHD (attention deficit hyperactivity disorder), combined type  Alcohol use disorder, severe, dependence (H)  Moderate cannabis use disorder (H)    Collateral Reports Completed:   Routed note to PCP       Plan: (Homework, other):   Patient was provided  Cessation of alcohol and all substances, Sober support group, Integrated MI-GRACIELA outpatient treatment, and learn about resources.  Patient was given information about behavioral services and encouraged to schedule a follow up appointment with the clinic [facility] in 2 weeks.         Shawn Ullrich [facility]      _____________________________________________________________________________________________________________________________________                             Individual Treatment Plan    Patient's Name: Haley Dejesus   YOB: 1995  Date of Creation: 11/26/24  Date Treatment  Plan Last Reviewed/Revised: NA    DSM5 Diagnoses:   F90.2 Attention-Deficit/Hyperactivity Disorder, Combined presentation  F33.1 Major Depressive Disorder, Recurrent Episode, Moderate   F41.1 Generalized Anxiety Disorder  F10.20 Substance-Related & Addictive Disorders Alcohol Use Disorder,Severe .  F12.20 Cannabis Use Disorder Moderate  ..  History of trauma      Psychosocial / Contextual Factors: Substance Use history/concerns and  , heterosexual, partnered female    PROMIS (reviewed every 90 days):  11/13/24    Cultural Influences that will aid in the patient's recovery and enhance resiliency: recognizing history of mental health challenges, eating disorder, excessive exercise    Considerations specific to safety concerns (eg suicidal, homicidal risks):  Jyothi declined to create a safety plan, stating she has one at home created with her previous therapist.  Jyothi was receptive to [facility] educating her about Crisis Resources/Services and providing written material with contact information.     How are Patient's natural supports included in treatment:  pt declined to include supports in treatment planning     Coordination of Care:  coordinate care with PCP      Review and Revisions of the Treatment Plan (every 180 days):   Treatment plan reviewed with patient; Treatment plan remains current based on the patient's status and progress to date       Referral / Collaboration:  Recommended Integrated MI-GRACIELA intensive outpatient treatment, psychiatric medication evaluation (CCPS, Collaborative Care Psychiatry Services), support group attendance (eg AA, SMART Recovery, Women for Sobriety), and outpatient psychotherapy services.  [facility] educated Jyothi recommended services, community support groups, and other resources (apps, books, podcasts, etc).  Jyothi declined integrated MI-GRACIELA IOP, medication evaluation, and support group attendance.  Jyothi was receptive to resources, including apps/books (will try a tracking  "ike, b/c tracking behaviors has helped in the past) and outpatient psychotherapy services with [facility].        Anticipated number of session for this episode of care:  6-9 sessions  Anticipation frequency of session: Every other week  Anticipated Duration of each session: 38-52 minutes  Treatment plan will be reviewed in 90 days or when goals have been changed.       MeasurableTreatment Goal(s) related to diagnosis / functional impairment(s)  Goal 1: Patient will learn healthy coping skills and practice them regularly. And identify how ADHD contributes to use    I will know I've met my goal when \"That I gained even more control...enough to hold myself accountability and put these thing in my daily routine\".      Status: New - Date: 11/26/24      Intervention(s)  [facility] will Motivational Interviewing (MI): Validate patient's thoughts, feelings and experience. Express respect for patient's autonomy in decision making.  Ask open-ended questions to invite patient's self-reflection and self-direction around change and what is important for them in working towards their goals.  Express and demonstrate empathy through reflective listening.  Affirm patient's strengths and abilities. Elicit change talk.  Psycho-education: Provide psycho-education about patient's behavioral health condition and symptoms. Explain and reviewed treatment options.  Mindfulness: Work with patient to identify automatic thoughts and judgments that occur in response to thoughts and emotions.  Provide education and guidance for the development of increased capacity for mindfulness and self-awareness.   Provide psycho-education around developing mindfulness strategies.      Patient has reviewed and agreed to the above plan.     Written by  Shawn Ullrich LICSW, LADC         "

## 2024-11-26 ENCOUNTER — OFFICE VISIT (OUTPATIENT)
Dept: BEHAVIORAL HEALTH | Facility: CLINIC | Age: 29
End: 2024-11-26
Payer: COMMERCIAL

## 2024-11-26 DIAGNOSIS — F12.20 MODERATE CANNABIS USE DISORDER (H): ICD-10-CM

## 2024-11-26 DIAGNOSIS — F41.1 GAD (GENERALIZED ANXIETY DISORDER): Primary | ICD-10-CM

## 2024-11-26 DIAGNOSIS — F10.20 ALCOHOL USE DISORDER, SEVERE, DEPENDENCE (H): ICD-10-CM

## 2024-11-26 DIAGNOSIS — F90.2 ADHD (ATTENTION DEFICIT HYPERACTIVITY DISORDER), COMBINED TYPE: ICD-10-CM

## 2024-11-26 DIAGNOSIS — F33.1 MAJOR DEPRESSIVE DISORDER, RECURRENT EPISODE, MODERATE (H): ICD-10-CM

## 2024-11-26 ASSESSMENT — PATIENT HEALTH QUESTIONNAIRE - PHQ9
10. IF YOU CHECKED OFF ANY PROBLEMS, HOW DIFFICULT HAVE THESE PROBLEMS MADE IT FOR YOU TO DO YOUR WORK, TAKE CARE OF THINGS AT HOME, OR GET ALONG WITH OTHER PEOPLE: SOMEWHAT DIFFICULT
SUM OF ALL RESPONSES TO PHQ QUESTIONS 1-9: 10
SUM OF ALL RESPONSES TO PHQ QUESTIONS 1-9: 10

## 2024-12-17 ENCOUNTER — MYC REFILL (OUTPATIENT)
Dept: FAMILY MEDICINE | Facility: CLINIC | Age: 29
End: 2024-12-17

## 2024-12-17 DIAGNOSIS — K21.00 GASTROESOPHAGEAL REFLUX DISEASE WITH ESOPHAGITIS, UNSPECIFIED WHETHER HEMORRHAGE: ICD-10-CM

## 2024-12-18 RX ORDER — ONDANSETRON 4 MG/1
4 TABLET, FILM COATED ORAL 3 TIMES DAILY PRN
Qty: 30 TABLET | Refills: 1 | Status: SHIPPED | OUTPATIENT
Start: 2024-12-18

## 2024-12-18 NOTE — TELEPHONE ENCOUNTER
Medication requested: ondansetron (ZOFRAN) 4 MG tablet   Last office visit: 1/12/24  [facility] appointments: none  Medication last refilled: 11/7/24; 30 + 1 refill  Last qualifying labs: N/A    Prescription approved per [facility] Refill Protocol.    Guido BUI, RN  12/18/24 2:49 PM

## 2025-01-14 ENCOUNTER — MYC REFILL (OUTPATIENT)
Dept: FAMILY MEDICINE | Facility: CLINIC | Age: 30
End: 2025-01-14

## 2025-01-14 DIAGNOSIS — F90.9 ATTENTION DEFICIT HYPERACTIVITY DISORDER (ADHD), UNSPECIFIED ADHD TYPE: ICD-10-CM

## 2025-01-14 RX ORDER — DEXTROAMPHETAMINE SACCHARATE, AMPHETAMINE ASPARTATE, DEXTROAMPHETAMINE SULFATE AND AMPHETAMINE SULFATE 2.5; 2.5; 2.5; 2.5 MG/1; MG/1; MG/1; MG/1
10 TABLET ORAL 2 TIMES DAILY
Qty: 60 TABLET | Refills: 0 | Status: SHIPPED | OUTPATIENT
Start: 2025-01-14

## 2025-01-14 NOTE — TELEPHONE ENCOUNTER
Medication requested: amphetamine-dextroamphetamine (ADDERALL) 10 MG tablet   Last office visit: 11/7/2024  [facility] appointments: none  Medication last refilled: 10/18/2024; 60 tabs + 0 refills; last sold #60 on 10/31/2024 per   Last qualifying labs: n/a    Routing refill request to provider for review/approval because:  Drug not on the [facility] refill protocol     HAO Myrick, RN  01/14/25, 1:03 PM

## 2025-01-14 NOTE — TELEPHONE ENCOUNTER
reviewed, med refilled as  noted.     Haley was seen today for refill request.    Diagnoses and all orders for this visit:    Attention deficit hyperactivity disorder (ADHD), unspecified ADHD type  -     amphetamine-dextroamphetamine (ADDERALL) 10 MG tablet; Take 1 tablet (10 mg) by mouth 2 times daily.      Issa Mendoza MD  1:07 PM, January 14, 2025

## 2025-01-22 DIAGNOSIS — F41.9 ANXIETY: ICD-10-CM

## 2025-01-22 RX ORDER — VENLAFAXINE HYDROCHLORIDE 75 MG/1
75 CAPSULE, EXTENDED RELEASE ORAL DAILY
Qty: 90 CAPSULE | Refills: 0 | Status: SHIPPED | OUTPATIENT
Start: 2025-01-22

## 2025-01-22 ASSESSMENT — PATIENT HEALTH QUESTIONNAIRE - PHQ9
SUM OF ALL RESPONSES TO PHQ QUESTIONS 1-9: 12
SUM OF ALL RESPONSES TO PHQ QUESTIONS 1-9: 12
10. IF YOU CHECKED OFF ANY PROBLEMS, HOW DIFFICULT HAVE THESE PROBLEMS MADE IT FOR YOU TO DO YOUR WORK, TAKE CARE OF THINGS AT HOME, OR GET ALONG WITH OTHER PEOPLE: VERY DIFFICULT

## 2025-01-22 ASSESSMENT — ANXIETY QUESTIONNAIRES
IF YOU CHECKED OFF ANY PROBLEMS ON THIS QUESTIONNAIRE, HOW DIFFICULT HAVE THESE PROBLEMS MADE IT FOR YOU TO DO YOUR WORK, TAKE CARE OF THINGS AT HOME, OR GET ALONG WITH OTHER PEOPLE: VERY DIFFICULT
4. TROUBLE RELAXING: MORE THAN HALF THE DAYS
7. FEELING AFRAID AS IF SOMETHING AWFUL MIGHT HAPPEN: NOT AT ALL
2. NOT BEING ABLE TO STOP OR CONTROL WORRYING: NEARLY EVERY DAY
8. IF YOU CHECKED OFF ANY PROBLEMS, HOW DIFFICULT HAVE THESE MADE IT FOR YOU TO DO YOUR WORK, TAKE CARE OF THINGS AT HOME, OR GET ALONG WITH OTHER PEOPLE?: VERY DIFFICULT
GAD7 TOTAL SCORE: 11
GAD7 TOTAL SCORE: 11
6. BECOMING EASILY ANNOYED OR IRRITABLE: NOT AT ALL
5. BEING SO RESTLESS THAT IT IS HARD TO SIT STILL: SEVERAL DAYS
3. WORRYING TOO MUCH ABOUT DIFFERENT THINGS: MORE THAN HALF THE DAYS
1. FEELING NERVOUS, ANXIOUS, OR ON EDGE: NEARLY EVERY DAY

## 2025-01-22 NOTE — TELEPHONE ENCOUNTER
Venlafaxine (Effexor XR) 75 mg    Last Office Visit: 11/7/24  [facility] Appointments: None  Medication last refilled: 10/18/24 #30 with 0 refill(s)    PHQ-9 / CONSUELO-7 Scores 10/7/22 8/18/23 11/26/24   CONSUELO-7 Score DocFlow 12 11 --   PHQ-9 Score DocFlow 10 18 10     Prescription approved per [facility] Refill Protocol.    LISA ChildN, RN, CCM

## 2025-01-23 ENCOUNTER — TELEPHONE (OUTPATIENT)
Dept: BEHAVIORAL HEALTH | Facility: CLINIC | Age: 30
End: 2025-01-23
Payer: COMMERCIAL

## 2025-01-23 ENCOUNTER — HOSPITAL ENCOUNTER (OUTPATIENT)
Dept: BEHAVIORAL HEALTH | Facility: CLINIC | Age: 30
End: 2025-01-23
Attending: PSYCHIATRY & NEUROLOGY
Payer: COMMERCIAL

## 2025-01-23 PROCEDURE — 90791 PSYCH DIAGNOSTIC EVALUATION: CPT

## 2025-01-23 ASSESSMENT — COLUMBIA-SUICIDE SEVERITY RATING SCALE - C-SSRS
2. HAVE YOU ACTUALLY HAD ANY THOUGHTS OF KILLING YOURSELF?: NO
1. HAVE YOU WISHED YOU WERE DEAD OR WISHED YOU COULD GO TO SLEEP AND NOT WAKE UP?: NO

## 2025-01-23 ASSESSMENT — PAIN SCALES - GENERAL: PAINLEVEL_OUTOF10: MILD PAIN (2)

## 2025-01-23 NOTE — TELEPHONE ENCOUNTER
The below telephone note was routed to the [facility] Patient Navigator at: DEPT-TRIAGETRANSITION-PATIENTNAVIGATOR [[id number]] on 1/23/2025 as a referral for IOP treatment at [facility].    A.)  Name: Haley Dejesus Tel #: [phone]  B.)  MRN: [id number]  C.)  Referral is for: an Intensive Outpatient program at one of the [facility] locations for co-occurring mental health and substance use disorder treatment.  D.)  CPA needs to be completed for [facility] only: NO  E.)  Notes: IOP referral for Kasey Cutler

## 2025-01-23 NOTE — TELEPHONE ENCOUNTER
Summary of Patient Care Communication Handoff to Patient Navigator Coordinator    PATIENT'S NAME: Haley Dejesus  MRN:   [id number]  :   1995    DATE OF SERVICE: 25    Referral Needed: Yes    Is the patient coming from an inpatient unit? No    What program is this referral for? IOP program for Co-Occurring Disorders at [facility] ([place] location, patient preferred the evening sessions but also open to mornings), Individual Therapy and Psychiatry for Medications Management.

## 2025-01-23 NOTE — PROGRESS NOTES
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 person has been exposed to a traumatic event in which both of the following were present:     (1) the person experienced, witnessed, or was confronted with an event or events that involved actual or threatened death or serious injury, or a threat to the physical integrity of self or others     (2) the person's response involved intense fear, helplessness, or horror. Note: In children, this may be expressed instead by disorganized or agitated behavior  B. The traumatic event is persistently reexperienced in one (or more) of the following ways:     - Recurrent and intrusive distressing recollections of the event, including images, thoughts, or perceptions. Note: In young children, repetitive play may occur in which themes or aspects of the trauma are expressed.      - Recurrent distressing dreams of the event. Note: In children, there may be frightening dreams without recognizable content.      - Acting or feeling as if the traumatic event were recurring (includes a sense of reliving the experience, illusions, hallucinations, and dissociative flashback episodes, including those that occur on awakening or when intoxicated). Note: In young children, trauma-specific reenactment may occur.      -  Intense psychological distress at exposure to internal or external cues that symbolize or resemble an aspect of the traumatic event.      - Physiological reactivity on exposure to internal or external cues that symbolize or resemble an aspect of the traumatic event.   C. Persistent avoidance of stimuli associated with the trauma and numbing of general responsiveness (not present before the trauma), as indicated by three (or more) of the following:  D. Persistent symptoms of increased arousal (not present before the trauma), as indicated by two (or more) of the following:  E. Duration of the disturbance is more than 1 month.  F. The disturbance causes clinically significant distress or impairment in social, occupational, or other important areas of functioning.    Functional Status:  Patient reports the following functional impairments:  management of the household and or completion of tasks, organization, relationship(s), self-care, social interactions, and work / vocational responsibilities.     GRACIELA/DUAL Programmatic Care:  1. Does the patient have a history of vulnerability such as being teased, picked on, or other indications of potential safety issues with other residents?  Yes: middle school    2. Does this patient have a history of being the victim of abuse? No history of abuse reported or documented.    3. Does this patient have a history of victimizing others? No     4. Does the patient have a history of boundary violations?  No.    5. Does the patient have a history of other sexual acting out behaviors (e.g grooming)?   No    6. Does the patient have a history of threats to self or others? Fire setting, running away or other self-injurious behaviors?    No    7. Does the patient s history indicate the need for special precautions or particular staffing patterns in the facility?  No      NOTE: If this screening indicates that the patient is at risk to harm self or others, notify staff at referral  location.    Clinical Summary:  1. Psychosocial Factors:  worsened mental health conditions, other life stressors, helplessness/hopelessness, lack of extensive social support, challenging interpersonal relationships, potential financial difficulties.  Cultural and Contextual Factors: Patient did not identify any concerns about cultural or contextual factors that need to be addressed  2. Principal DSM5 Diagnoses  (Sustained by DSM5 Criteria Listed Above):   296.32 (F33.1) Major Depressive Disorder, Recurrent Episode, Moderate _ and With anxious distress  300.02 (F41.1) Generalized Anxiety Disorder  309.81 (F43.10) Posttraumatic Stress Disorder (includes Posttraumatic Stress Disorder for Children 6 Years and Younger)  With dissociative symptoms.  3. Other Diagnoses that is relevant to services:   Substance-Related & Addictive Disorders Alcohol Use Disorder   303.90 (F10.20) Severe In a controlled environment  304.30 (F12.20) Cannabis Use Disorder Moderate  In a controlled environment.  4. Provisional Diagnosis:  Attention-Deficit/Hyperactivity Disorder  314.01 (F90.2) Combined presentation  307.50 (F50.9) Unspecified Feeding or Eating Disorder as evidenced by patient .  5. Prognosis: Expect Improvement.  6. Likely consequences of symptoms if not treated: patient's ongoing symptoms are more than likely to get worse and experience a decreased daily in functioning and may require a higher level of care.  7. Patient strengths include:  educated, empathetic, employed, goal-focused, good listener, has a previous history of therapy, intelligent, open to suggestions / feedback, support of family, friends and providers, willing to ask questions, willing to relate to others, and work history .     Recommendations:     1. Plan for Safety and Risk Management:   Safety and Risk: Recommended that patient call 911 or go to the local ED should there be a change in any of these risk factors        Report to child / adult protection  "services was NA.     2. Patient's did not identified any cultural, kylah / Jehovah's witness / spiritual influences that will need to be incorporated into care.     3. Initial Treatment will focus on:   Depressed Mood -    Anxiety -    GRACIELA: Alcohol and Cannabis  .     4. Resources/Service Plan:    services are not indicated.   Modifications to assist communication are not indicated.   Additional disability accommodations are not indicated.      5. Collaboration:   Collaboration / coordination of treatment will be initiated with the following  support professionals: Targeted Case Management (TCM).      6.  Referrals:   The following referral(s) will be initiated: [facility] program for Co-Occurring Disorders, Individual Therapy and Psychiatry for Medications Management.       A Release of Information has been obtained for the following: Targeted Case Management (TCM).     Clinical Substantiation/medical necessity for the above recommendations:  Patient is a 29-year-old single  female with no children who presents with a history of MDD, CONSUELO, ADHD, PTSD, Severe Alcohol and Moderate Cannabis Use Disorders. Patient currently doesn't have neither a therapist nor a psychiatrist, and his PCP is prescribing psychiatric medications.  offered referrals to Individual Therapy, Psychiatry for Medications Management and the patient accepted. In addition, the patient is interested to joining IOP program for Co-Occuring Disorders at [facility] ([place] location, patient preferred the evening session but also open to mornings) due to \" Improve her worsened symptoms of Anxiety and Depression, stay clean and sober and extend her social and supportive network\". The referrals were sent to [facility].  Patient is not currently under the influence of alcohol or illicit substances, denies experiencing command hallucinations, and has no direct access to firearms. Patient's acute risk could be higher if " noncompliant with treatment plan, medications, follow-up appointments or using illicit substances or alcohol. Protective factors include: hopeful, identifies reason for living, access to and engagement with healthcare, current engagement in treatment and/or motivation to establish therapeutic relationship, supportive social network or family, lives in a responsibly safe environment, and responsibilities to others commitment to well being; sense of meaning; help seeking behaviors when distressed. The patient's strengths are: educated, empathetic, employed, goal-focused, good listener, has a previous history of therapy, intelligent, open to suggestions / feedback, support of family, friends and providers, willing to ask questions, willing to relate to others, and work history. Patient instructed to present to her nearest emergency room if symptoms deteriorate...    7. GRACIELA:    GRACIELA:  Discussed the general effects of drugs and alcohol on health and well-being. Provider gave patient printed information about the effects of chemical use on their health and well being. Recommendations:  Abstinence, NA groups, Sponsor and other available community resources as needed.  .     8. Records:   These were reviewed at time of assessment.   Information in this assessment was obtained from the medical record and  provided by patient who is a good historian. Patient will have open access to their mental health medical record.    9.   Interactive Complexity: No    10. Safety Plan:     [facility] Safety Plan      Creation Date: 1/23/25       Step 1: Warning signs:    Warning Signs    Worcened symptoms of Anxiety and Depression    Isolation    Dissotiation    Mood swings    Unable to sleep      Step 2: Internal coping strategies - Things I can do to take my mind off my problems without contacting another person:    Strategies    Going for a walk    Watching and checking some social media    Call my friends    Cleaning the house       Step 3: People and social settings that provide distraction:    Name Contact Information    My boyfriend Jakob Walker     My friend Glory     My sisters        Places    Coffee Shop    GYM    Stores      Step 4: People whom I can ask for help during a crisis:    Name Contact Information    My boyfriend     My sisters     My friends       Step 5: Professionals or agencies I can contact during a crisis:    Clinician/Agency Name Phone Emergency Contact    [facility]      Text 988      [facility] Crisis Line  [phone]      Local Emergency Department Emergency Department Address Emergency Department Phone    [facility] ED        Suicide Prevention Lifeline Phone: Call or Text 988  Crisis Text Line: Text HOME to 859932     Step 6: Making the environment safer (plan for lethal means safety):   Did not identify any lethal methods     Optional: What is most important to me and worth living for?:   Myself, family and friends.      Brinda Safety Plan. Emily Andrade and Catrachito Ford. Used with permission of the authors.       This patient's GRACIELA Service updated DAANES information was entered directly into the [place] Department of Human Services DAANES website on 01/23/2025.  Service Initiation Date ID: [id number]    Provider Name/ Credentials:  Austyn Posey PhD, LPCC, LADC.   [facility]    Mental Health & Addiction Clinical Services  [address] [place] [address], [address] [address]   [email or web address]  Office: [phone] Fax: [phone]  January 23, 2025

## 2025-01-23 NOTE — PROGRESS NOTES
LOCUS Worksheet     Name: Haley Dejesus MRN: [id number]    : 1995      Gender:  female    PMI:  NA   Provider Name: [facility]   Provider NPI:  [id number]    Actual level of Care Provided:  Assessment and Referral     Service(s) receiving or referred to:  IOP program for Co-Occurring Disorders at [facility] ([place] location, patient preferred the evening session but also open to mornings), Individual Therapy and Psychiatry for Medications Management.      Reason for Variance: Anxiety, Depression, GRACIELA: Alcohol and Cannabis      Rating completed by: Austyn Posey PhD, LPCC/LADC      I. Risk of Harm:   2      Low Risk of Harm    II. Functional Status:   2      Mild Impairment    III. Co-Morbidity:   2      Minor Co-Morbidity    IV - A. Recovery Environment - Level of Stress:   3      Moderately Stress Environment    IV - B. Recovery Environment - Level of Support:   4      Minimal Support in Environment    V. Treatment and Recovery History:   3      Moderate to Equivocal Response to Treatment and Recovery Management    VI. Engagement and Recovery Project:   2      Positive Engagement and Recovery       18 Composite Score    Level of Care Recommendation:   17 to 19       High Intensity Community Based Services

## 2025-02-25 ENCOUNTER — MYC REFILL (OUTPATIENT)
Dept: FAMILY MEDICINE | Facility: CLINIC | Age: 30
End: 2025-02-25

## 2025-02-25 DIAGNOSIS — F90.9 ATTENTION DEFICIT HYPERACTIVITY DISORDER (ADHD), UNSPECIFIED ADHD TYPE: ICD-10-CM

## 2025-02-26 RX ORDER — DEXTROAMPHETAMINE SACCHARATE, AMPHETAMINE ASPARTATE, DEXTROAMPHETAMINE SULFATE AND AMPHETAMINE SULFATE 2.5; 2.5; 2.5; 2.5 MG/1; MG/1; MG/1; MG/1
10 TABLET ORAL 2 TIMES DAILY
Qty: 60 TABLET | Refills: 0 | Status: SHIPPED | OUTPATIENT
Start: 2025-02-26

## 2025-02-26 NOTE — TELEPHONE ENCOUNTER
Amphetamine-dextroamphetamine (Adderall) 10 mg    Last Office Visit: 11/7/24  [place] [facility] [place]  Medication last refilled: 1/14/25 #60 with 0 refill(s)     verified - last fill date: 1/21/25 #60    CSA on File - Yes, 10/7/22    Routing refill request to provider for review/approval because:  Drug not on the G refill protocol     HAO Child, RN, CCM

## 2025-02-27 NOTE — TELEPHONE ENCOUNTER
reviewed, med refilled as noted.     Haley was seen today for refill request.    Diagnoses and all orders for this visit:    Attention deficit hyperactivity disorder (ADHD), unspecified ADHD type  -     amphetamine-dextroamphetamine (ADDERALL) 10 MG tablet; Take 1 tablet (10 mg) by mouth 2 times daily.      Issa Mendoza MD  6:01 PM, February 26, 2025

## 2025-03-31 ENCOUNTER — MYC REFILL (OUTPATIENT)
Dept: FAMILY MEDICINE | Facility: CLINIC | Age: 30
End: 2025-03-31

## 2025-03-31 DIAGNOSIS — F90.9 ATTENTION DEFICIT HYPERACTIVITY DISORDER (ADHD), UNSPECIFIED ADHD TYPE: ICD-10-CM

## 2025-03-31 RX ORDER — DEXTROAMPHETAMINE SACCHARATE, AMPHETAMINE ASPARTATE, DEXTROAMPHETAMINE SULFATE AND AMPHETAMINE SULFATE 2.5; 2.5; 2.5; 2.5 MG/1; MG/1; MG/1; MG/1
10 TABLET ORAL 2 TIMES DAILY
Qty: 60 TABLET | Refills: 0 | Status: CANCELLED | OUTPATIENT
Start: 2025-03-31

## 2025-04-14 ENCOUNTER — MYC REFILL (OUTPATIENT)
Dept: FAMILY MEDICINE | Facility: CLINIC | Age: 30
End: 2025-04-14

## 2025-04-14 DIAGNOSIS — F90.9 ATTENTION DEFICIT HYPERACTIVITY DISORDER (ADHD), UNSPECIFIED ADHD TYPE: ICD-10-CM

## 2025-04-14 RX ORDER — DEXTROAMPHETAMINE SACCHARATE, AMPHETAMINE ASPARTATE, DEXTROAMPHETAMINE SULFATE AND AMPHETAMINE SULFATE 2.5; 2.5; 2.5; 2.5 MG/1; MG/1; MG/1; MG/1
10 TABLET ORAL 2 TIMES DAILY
Qty: 60 TABLET | Refills: 0 | Status: SHIPPED | OUTPATIENT
Start: 2025-04-14

## 2025-04-14 NOTE — TELEPHONE ENCOUNTER
Amphetamine-dextroamphetamine (Adderall) 10 mg    Last Office Visit: 11/7/24  [facility] [place] [place]: None  Medication last refilled: 2/26/25 #60 with 0 refill(s)     verified - last fill date: 3/17/25 #60    CSA on File - Yes, 10/7/22    Prescription approved per [facility] Refill Protocol.    LISA ChildN, RN, CCM    
No

## 2025-04-17 ENCOUNTER — TELEPHONE (OUTPATIENT)
Dept: BEHAVIORAL HEALTH | Facility: CLINIC | Age: 30
End: 2025-04-17

## 2025-05-12 ENCOUNTER — MYC REFILL (OUTPATIENT)
Dept: FAMILY MEDICINE | Facility: CLINIC | Age: 30
End: 2025-05-12

## 2025-05-12 DIAGNOSIS — F90.9 ATTENTION DEFICIT HYPERACTIVITY DISORDER (ADHD), UNSPECIFIED ADHD TYPE: ICD-10-CM

## 2025-05-12 RX ORDER — DEXTROAMPHETAMINE SACCHARATE, AMPHETAMINE ASPARTATE, DEXTROAMPHETAMINE SULFATE AND AMPHETAMINE SULFATE 2.5; 2.5; 2.5; 2.5 MG/1; MG/1; MG/1; MG/1
10 TABLET ORAL 2 TIMES DAILY
Qty: 60 TABLET | Refills: 0 | Status: CANCELLED | OUTPATIENT
Start: 2025-05-12

## 2025-05-13 DIAGNOSIS — Z30.9 ENCOUNTER FOR CONTRACEPTIVE MANAGEMENT, UNSPECIFIED TYPE: ICD-10-CM

## 2025-05-13 RX ORDER — LEVONORGESTREL/ETHIN.ESTRADIOL 0.1-0.02MG
1 TABLET ORAL DAILY
Qty: 84 TABLET | Refills: 0 | Status: SHIPPED | OUTPATIENT
Start: 2025-05-13

## 2025-05-13 NOTE — TELEPHONE ENCOUNTER
Medication requested: levonorgestrel-ethinyl estradiol (AVIANE) 0.1-20 MG-MCG tablet   Last office visit: 1/12/24  [facility] appointments: none  Medication last refilled: 3/11/24; 84 + 3 refills  Last qualifying labs: N/A    Medication is being filled for 1 time refill only due to:  Patient needs to be seen because it has been more than one year since last visit.    Guido BUI, RN  05/13/25 2:56 PM

## 2025-05-13 NOTE — TELEPHONE ENCOUNTER
Medication requested: amphetamine-dextroamphetamine (ADDERALL) 10 MG tablet   Last office visit: 1/12/24  [facility] appointments: none  Medication last refilled: 4/14/25; 60 + 0 refills, last sold 4/22/25 per phone call to pharmacy  Last qualifying labs: N/A    *Hold until Tuesday 5/20*    Routing refill request to provider for review/approval because:  Drug not on the G refill protocol     Guido BUI, RN  05/13/25 12:11 PM

## 2025-05-20 RX ORDER — DEXTROAMPHETAMINE SACCHARATE, AMPHETAMINE ASPARTATE, DEXTROAMPHETAMINE SULFATE AND AMPHETAMINE SULFATE 2.5; 2.5; 2.5; 2.5 MG/1; MG/1; MG/1; MG/1
10 TABLET ORAL 2 TIMES DAILY
Qty: 60 TABLET | Refills: 0 | Status: SHIPPED | OUTPATIENT
Start: 2025-05-20

## 2025-05-20 NOTE — TELEPHONE ENCOUNTER
reviewed, med refilled as noted.     Haley was seen today for refill request.    Diagnoses and all orders for this visit:    Attention deficit hyperactivity disorder (ADHD), unspecified ADHD type  -     amphetamine-dextroamphetamine (ADDERALL) 10 MG tablet; Take 1 tablet (10 mg) by mouth 2 times daily.      Issa Mendoza MD  10:58 AM, May 20, 2025

## 2025-06-19 ENCOUNTER — MYC REFILL (OUTPATIENT)
Dept: FAMILY MEDICINE | Facility: CLINIC | Age: 30
End: 2025-06-19

## 2025-06-19 DIAGNOSIS — F90.9 ATTENTION DEFICIT HYPERACTIVITY DISORDER (ADHD), UNSPECIFIED ADHD TYPE: ICD-10-CM

## 2025-06-19 RX ORDER — DEXTROAMPHETAMINE SACCHARATE, AMPHETAMINE ASPARTATE, DEXTROAMPHETAMINE SULFATE AND AMPHETAMINE SULFATE 2.5; 2.5; 2.5; 2.5 MG/1; MG/1; MG/1; MG/1
10 TABLET ORAL 2 TIMES DAILY
Qty: 60 TABLET | Refills: 0 | Status: SHIPPED | OUTPATIENT
Start: 2025-06-24

## 2025-06-19 NOTE — TELEPHONE ENCOUNTER
reviewed, med refilled as noted.     Haley was seen today for refill request.    Diagnoses and all orders for this visit:    Attention deficit hyperactivity disorder (ADHD), unspecified ADHD type  -     amphetamine-dextroamphetamine (ADDERALL) 10 MG tablet; Take 1 tablet (10 mg) by mouth 2 times daily.      Issa Mendoza MD  5:31 PM, June 19, 2025

## 2025-06-19 NOTE — TELEPHONE ENCOUNTER
Amphetamine-dextroamphetamine (Adderall) 10 mg    Last Office Visit: 11/7/24  [place] [facility] [place]  Medication last refilled: 5/20/25 #60 with 0 refill(s)     verified - last fill date: 5/27/25 #60    CSA on File - 10/7/22  Last [facility] - None    Routing refill request to provider for review/approval because:  Drug not on the [facility] refill protocol     LISA ChildN, RN, CCM

## 2025-08-04 DIAGNOSIS — Z86.19 HISTORY OF COLD SORES: ICD-10-CM

## 2025-08-04 RX ORDER — VALACYCLOVIR HYDROCHLORIDE 1 G/1
2000 TABLET, FILM COATED ORAL 2 TIMES DAILY
Qty: 4 TABLET | Refills: 4 | Status: SHIPPED | OUTPATIENT
Start: 2025-08-04

## 2025-09-03 ENCOUNTER — MYC REFILL (OUTPATIENT)
Dept: FAMILY MEDICINE | Facility: CLINIC | Age: 30
End: 2025-09-03

## 2025-09-03 DIAGNOSIS — Z30.9 ENCOUNTER FOR CONTRACEPTIVE MANAGEMENT, UNSPECIFIED TYPE: ICD-10-CM

## 2025-09-03 DIAGNOSIS — F41.9 ANXIETY: ICD-10-CM

## 2025-09-03 DIAGNOSIS — K21.00 GASTROESOPHAGEAL REFLUX DISEASE WITH ESOPHAGITIS, UNSPECIFIED WHETHER HEMORRHAGE: ICD-10-CM

## 2025-09-03 DIAGNOSIS — F90.9 ATTENTION DEFICIT HYPERACTIVITY DISORDER (ADHD), UNSPECIFIED ADHD TYPE: ICD-10-CM

## 2025-09-04 RX ORDER — LEVONORGESTREL/ETHIN.ESTRADIOL 0.1-0.02MG
1 TABLET ORAL DAILY
Qty: 84 TABLET | Refills: 0 | Status: SHIPPED | OUTPATIENT
Start: 2025-09-04

## 2025-09-04 RX ORDER — PROPRANOLOL HYDROCHLORIDE 10 MG/1
10 TABLET ORAL 3 TIMES DAILY PRN
Qty: 90 TABLET | Refills: 0 | Status: SHIPPED | OUTPATIENT
Start: 2025-09-04

## 2025-09-04 RX ORDER — ONDANSETRON 4 MG/1
4 TABLET, FILM COATED ORAL 3 TIMES DAILY PRN
Qty: 30 TABLET | Refills: 0 | Status: SHIPPED | OUTPATIENT
Start: 2025-09-04

## 2025-09-04 RX ORDER — DEXTROAMPHETAMINE SACCHARATE, AMPHETAMINE ASPARTATE, DEXTROAMPHETAMINE SULFATE AND AMPHETAMINE SULFATE 2.5; 2.5; 2.5; 2.5 MG/1; MG/1; MG/1; MG/1
10 TABLET ORAL 2 TIMES DAILY
Qty: 60 TABLET | Refills: 0 | Status: SHIPPED | OUTPATIENT
Start: 2025-09-04